# Patient Record
Sex: MALE | Race: WHITE | NOT HISPANIC OR LATINO
[De-identification: names, ages, dates, MRNs, and addresses within clinical notes are randomized per-mention and may not be internally consistent; named-entity substitution may affect disease eponyms.]

---

## 2016-11-17 RX ORDER — DOCUSATE SODIUM 100 MG
1 CAPSULE ORAL
Qty: 0 | Refills: 0 | COMMUNITY
Start: 2016-11-17

## 2017-01-03 ENCOUNTER — NON-APPOINTMENT (OUTPATIENT)
Age: 82
End: 2017-01-03

## 2017-01-03 ENCOUNTER — APPOINTMENT (OUTPATIENT)
Dept: CARDIOLOGY | Facility: CLINIC | Age: 82
End: 2017-01-03

## 2017-01-03 VITALS — OXYGEN SATURATION: 96 % | HEART RATE: 77 BPM | SYSTOLIC BLOOD PRESSURE: 130 MMHG | DIASTOLIC BLOOD PRESSURE: 67 MMHG

## 2017-01-03 DIAGNOSIS — B35.6 TINEA CRURIS: ICD-10-CM

## 2017-01-03 DIAGNOSIS — E11.9 TYPE 2 DIABETES MELLITUS W/OUT COMPLICATIONS: ICD-10-CM

## 2017-01-03 DIAGNOSIS — K59.00 CONSTIPATION, UNSPECIFIED: ICD-10-CM

## 2017-01-03 DIAGNOSIS — R33.9 RETENTION OF URINE, UNSPECIFIED: ICD-10-CM

## 2017-01-03 DIAGNOSIS — F41.9 ANXIETY DISORDER, UNSPECIFIED: ICD-10-CM

## 2017-01-03 DIAGNOSIS — H93.8X3 OTHER SPECIFIED DISORDERS OF EAR, BILATERAL: ICD-10-CM

## 2017-01-03 DIAGNOSIS — I70.202 UNSPECIFIED ATHEROSCLEROSIS OF NATIVE ARTERIES OF EXTREMITIES, LEFT LEG: ICD-10-CM

## 2017-01-03 DIAGNOSIS — R54 AGE-RELATED PHYSICAL DEBILITY: ICD-10-CM

## 2017-01-03 DIAGNOSIS — I48.91 UNSPECIFIED ATRIAL FIBRILLATION: ICD-10-CM

## 2017-01-03 DIAGNOSIS — E78.00 PURE HYPERCHOLESTEROLEMIA, UNSPECIFIED: ICD-10-CM

## 2017-01-03 LAB — INR PPP: 1.4 RATIO

## 2017-01-03 RX ORDER — NYSTATIN 100000 1/G
100000 POWDER TOPICAL
Qty: 1 | Refills: 2 | Status: ACTIVE | COMMUNITY
Start: 2017-01-03 | End: 1900-01-01

## 2017-01-03 RX ORDER — FLUTICASONE PROPIONATE 50 UG/1
50 SPRAY, METERED NASAL DAILY
Qty: 1 | Refills: 0 | Status: ACTIVE | COMMUNITY
Start: 2017-01-03 | End: 1900-01-01

## 2017-01-03 RX ORDER — HYDROCORTISONE ACETATE 25 MG/1
25 SUPPOSITORY RECTAL
Qty: 30 | Refills: 0 | Status: ACTIVE | COMMUNITY
Start: 2017-01-03 | End: 1900-01-01

## 2017-01-03 RX ORDER — ROSUVASTATIN CALCIUM 40 MG/1
40 TABLET, FILM COATED ORAL
Qty: 90 | Refills: 2 | Status: ACTIVE | COMMUNITY
Start: 2017-01-03 | End: 1900-01-01

## 2017-01-04 ENCOUNTER — NON-APPOINTMENT (OUTPATIENT)
Age: 82
End: 2017-01-04

## 2017-01-04 PROBLEM — F41.9 ANXIETY: Status: ACTIVE | Noted: 2017-01-04

## 2017-01-05 ENCOUNTER — LABORATORY RESULT (OUTPATIENT)
Age: 82
End: 2017-01-05

## 2017-01-09 ENCOUNTER — LABORATORY RESULT (OUTPATIENT)
Age: 82
End: 2017-01-09

## 2017-01-14 ENCOUNTER — INPATIENT (INPATIENT)
Facility: HOSPITAL | Age: 82
LOS: 5 days | Discharge: HOME | DRG: 372 | End: 2017-01-20
Attending: INTERNAL MEDICINE | Admitting: INTERNAL MEDICINE
Payer: MEDICARE

## 2017-01-14 VITALS
DIASTOLIC BLOOD PRESSURE: 72 MMHG | RESPIRATION RATE: 20 BRPM | OXYGEN SATURATION: 96 % | HEART RATE: 70 BPM | SYSTOLIC BLOOD PRESSURE: 150 MMHG | TEMPERATURE: 97 F

## 2017-01-14 LAB
ALBUMIN SERPL ELPH-MCNC: 3.5 G/DL — SIGNIFICANT CHANGE UP (ref 3.3–5)
ALP SERPL-CCNC: 72 U/L — SIGNIFICANT CHANGE UP (ref 40–120)
ALT FLD-CCNC: 12 U/L RC — SIGNIFICANT CHANGE UP (ref 10–45)
ANION GAP SERPL CALC-SCNC: 19 MMOL/L — HIGH (ref 5–17)
AST SERPL-CCNC: 21 U/L — SIGNIFICANT CHANGE UP (ref 10–40)
BASOPHILS # BLD AUTO: 0.1 K/UL — SIGNIFICANT CHANGE UP (ref 0–0.2)
BASOPHILS NFR BLD AUTO: 0.8 % — SIGNIFICANT CHANGE UP (ref 0–2)
BILIRUB SERPL-MCNC: 0.6 MG/DL — SIGNIFICANT CHANGE UP (ref 0.2–1.2)
BUN SERPL-MCNC: 18 MG/DL — SIGNIFICANT CHANGE UP (ref 7–23)
CALCIUM SERPL-MCNC: 9.8 MG/DL — SIGNIFICANT CHANGE UP (ref 8.4–10.5)
CHLORIDE SERPL-SCNC: 93 MMOL/L — LOW (ref 96–108)
CK MB CFR SERPL CALC: 2 NG/ML — SIGNIFICANT CHANGE UP (ref 0–6.7)
CK SERPL-CCNC: 46 U/L — SIGNIFICANT CHANGE UP (ref 30–200)
CO2 SERPL-SCNC: 23 MMOL/L — SIGNIFICANT CHANGE UP (ref 22–31)
CREAT SERPL-MCNC: 1.36 MG/DL — HIGH (ref 0.5–1.3)
EOSINOPHIL # BLD AUTO: 0 K/UL — SIGNIFICANT CHANGE UP (ref 0–0.5)
EOSINOPHIL NFR BLD AUTO: 0.4 % — SIGNIFICANT CHANGE UP (ref 0–6)
GAS PNL BLDV: SIGNIFICANT CHANGE UP
GLUCOSE SERPL-MCNC: 198 MG/DL — HIGH (ref 70–99)
HCT VFR BLD CALC: 31.3 % — LOW (ref 39–50)
HGB BLD-MCNC: 10.9 G/DL — LOW (ref 13–17)
LYMPHOCYTES # BLD AUTO: 1.5 K/UL — SIGNIFICANT CHANGE UP (ref 1–3.3)
LYMPHOCYTES # BLD AUTO: 16.7 % — SIGNIFICANT CHANGE UP (ref 13–44)
MCHC RBC-ENTMCNC: 32.9 PG — SIGNIFICANT CHANGE UP (ref 27–34)
MCHC RBC-ENTMCNC: 34.9 GM/DL — SIGNIFICANT CHANGE UP (ref 32–36)
MCV RBC AUTO: 94.3 FL — SIGNIFICANT CHANGE UP (ref 80–100)
MONOCYTES # BLD AUTO: 0.8 K/UL — SIGNIFICANT CHANGE UP (ref 0–0.9)
MONOCYTES NFR BLD AUTO: 9.4 % — SIGNIFICANT CHANGE UP (ref 2–14)
NEUTROPHILS # BLD AUTO: 6.5 K/UL — SIGNIFICANT CHANGE UP (ref 1.8–7.4)
NEUTROPHILS NFR BLD AUTO: 72.5 % — SIGNIFICANT CHANGE UP (ref 43–77)
PLATELET # BLD AUTO: 269 K/UL — SIGNIFICANT CHANGE UP (ref 150–400)
POTASSIUM SERPL-MCNC: 5.3 MMOL/L — SIGNIFICANT CHANGE UP (ref 3.5–5.3)
POTASSIUM SERPL-SCNC: 5.3 MMOL/L — SIGNIFICANT CHANGE UP (ref 3.5–5.3)
PROT SERPL-MCNC: 6.9 G/DL — SIGNIFICANT CHANGE UP (ref 6–8.3)
RBC # BLD: 3.32 M/UL — LOW (ref 4.2–5.8)
RBC # FLD: 15.1 % — HIGH (ref 10.3–14.5)
SODIUM SERPL-SCNC: 135 MMOL/L — SIGNIFICANT CHANGE UP (ref 135–145)
TROPONIN T SERPL-MCNC: 0.03 NG/ML — SIGNIFICANT CHANGE UP (ref 0–0.06)
WBC # BLD: 8.9 K/UL — SIGNIFICANT CHANGE UP (ref 3.8–10.5)
WBC # FLD AUTO: 8.9 K/UL — SIGNIFICANT CHANGE UP (ref 3.8–10.5)

## 2017-01-14 PROCEDURE — 71260 CT THORAX DX C+: CPT | Mod: 26

## 2017-01-14 PROCEDURE — 74177 CT ABD & PELVIS W/CONTRAST: CPT | Mod: 26

## 2017-01-14 PROCEDURE — 93010 ELECTROCARDIOGRAM REPORT: CPT

## 2017-01-14 PROCEDURE — 72125 CT NECK SPINE W/O DYE: CPT | Mod: 26

## 2017-01-14 PROCEDURE — 70450 CT HEAD/BRAIN W/O DYE: CPT | Mod: 26

## 2017-01-14 PROCEDURE — 99285 EMERGENCY DEPT VISIT HI MDM: CPT | Mod: 25

## 2017-01-14 RX ORDER — SODIUM CHLORIDE 9 MG/ML
500 INJECTION INTRAMUSCULAR; INTRAVENOUS; SUBCUTANEOUS ONCE
Qty: 0 | Refills: 0 | Status: COMPLETED | OUTPATIENT
Start: 2017-01-14 | End: 2017-01-14

## 2017-01-14 RX ORDER — ACETAMINOPHEN 500 MG
1000 TABLET ORAL ONCE
Qty: 0 | Refills: 0 | Status: COMPLETED | OUTPATIENT
Start: 2017-01-14 | End: 2017-01-14

## 2017-01-14 RX ADMIN — SODIUM CHLORIDE 500 MILLILITER(S): 9 INJECTION INTRAMUSCULAR; INTRAVENOUS; SUBCUTANEOUS at 21:36

## 2017-01-14 RX ADMIN — Medication 400 MILLIGRAM(S): at 18:44

## 2017-01-14 NOTE — ED ADULT NURSE NOTE - PSH
Personal History of CABG (Coronary Artery Bypass Graft)  30  years ago  Personal History of Heart Valve Repair  15 years ago unsure which valve?  s/p Angioplasty with Stent  6 years ago

## 2017-01-14 NOTE — ED PROVIDER NOTE - OBJECTIVE STATEMENT
85M hx htn hld dm afib on coumadin pad presents with syncope. Pt was having a lot of diarrhea today had a syncopal episode fell hit head head. Now complaining of head pain neck abd pain. no chest pain or sob. moderate constant non-radiating

## 2017-01-14 NOTE — ED PROVIDER NOTE - MEDICAL DECISION MAKING DETAILS
Abdominal pain with diarrhea weakness resulting in syncope nonfocal neuro abdo tender diffuse iv hydration labs CT abdomen and pelvis admit --Pinon

## 2017-01-14 NOTE — ED PROVIDER NOTE - SKIN, MLM
Skin normal color for race, warm, dry and intact. No evidence of rash. Skin normal color for race, warm, dry and intact. No evidence of rash. Bilateral heel pressure sores darkened areas over posterior aspect of heels

## 2017-01-14 NOTE — ED PROVIDER NOTE - PSYCHIATRIC, MLM
Alert and oriented to person, place, time/situation. normal mood and affect. no apparent risk to self or others. anxious

## 2017-01-14 NOTE — ED PROVIDER NOTE - ATTENDING CONTRIBUTION TO CARE
I have seen and evaluated this patient with the resident.   I agree with the findings  unless other wise stated.  I have made appropriate changes in documentations where needed, After my face to face bedside evaluation, I am further  noting: Abdominal pain with diarrhea weakness resulting in syncope nonfocal neuro abdo tender diffuse iv hydration labs CT abdomen and pelvis admit --Pinon

## 2017-01-14 NOTE — ED ADULT NURSE NOTE - EENT WDL
Eyes with no visual disturbances.  Patient has some difficulty hearing.  Patient states his ear feels "clogged"

## 2017-01-14 NOTE — ED ADULT NURSE NOTE - PMH
Atrial fibrillation    CAD (Coronary Artery Disease)    Cataract    DM (Diabetes Mellitus)    Foot ulcer  left foot  GERD (Gastroesophageal Reflux Disease)    HTN (hypertension)    Hyperlipidemia    PAD (peripheral artery disease)    PC (Prostate Cancer)  treated with seed implant as per pt.'s wife 15 yrs ago  Vertigo

## 2017-01-14 NOTE — ED ADULT NURSE NOTE - OBJECTIVE STATEMENT
86 y/o M presents to the ED via EMS because the patient synopsized, as per wife.  Wife states that the patient was in the bathroom with diarrhea and when the wife went to help him, he synopsized.  Wife states that the diarrhea started this morning.  Patient was in the ED because of a UTI and pneumonia on December 15th.  The patient states that he ears feel clogged.  The wife states that when the patient fell his "eyes rolled back and he bite his tongue".  The wife states when he fell he hit his chin.  Wife states that the patient had LOC, and the patient states he does not remember the episode.  Patient is A&Ox4. Face is symmetrical.  Patient has inspiratory wheezes bilaterally.  Patient with a hx of COPD on home O2 of 2L.   Patient states that he is unable to walk and has generalized weakness to the lower extremities.  Patient mucous membranes are dry.   Patient denies any vomiting, chest pain, fever, chills headache, dizziness.  Wife at bedside.  Patient safety and comfort measures provided.

## 2017-01-15 DIAGNOSIS — R55 SYNCOPE AND COLLAPSE: ICD-10-CM

## 2017-01-15 DIAGNOSIS — I48.91 UNSPECIFIED ATRIAL FIBRILLATION: ICD-10-CM

## 2017-01-15 DIAGNOSIS — N18.9 CHRONIC KIDNEY DISEASE, UNSPECIFIED: ICD-10-CM

## 2017-01-15 DIAGNOSIS — L97.509 NON-PRESSURE CHRONIC ULCER OF OTHER PART OF UNSPECIFIED FOOT WITH UNSPECIFIED SEVERITY: ICD-10-CM

## 2017-01-15 DIAGNOSIS — E11.9 TYPE 2 DIABETES MELLITUS WITHOUT COMPLICATIONS: ICD-10-CM

## 2017-01-15 DIAGNOSIS — I10 ESSENTIAL (PRIMARY) HYPERTENSION: ICD-10-CM

## 2017-01-15 DIAGNOSIS — C61 MALIGNANT NEOPLASM OF PROSTATE: ICD-10-CM

## 2017-01-15 DIAGNOSIS — R19.7 DIARRHEA, UNSPECIFIED: ICD-10-CM

## 2017-01-15 DIAGNOSIS — Z41.8 ENCOUNTER FOR OTHER PROCEDURES FOR PURPOSES OTHER THAN REMEDYING HEALTH STATE: ICD-10-CM

## 2017-01-15 LAB
ANION GAP SERPL CALC-SCNC: 14 MMOL/L — SIGNIFICANT CHANGE UP (ref 5–17)
APPEARANCE UR: CLEAR — SIGNIFICANT CHANGE UP
APTT BLD: 32.2 SEC — SIGNIFICANT CHANGE UP (ref 27.5–37.4)
BILIRUB UR-MCNC: NEGATIVE — SIGNIFICANT CHANGE UP
BUN SERPL-MCNC: 15 MG/DL — SIGNIFICANT CHANGE UP (ref 7–23)
CALCIUM SERPL-MCNC: 10 MG/DL — SIGNIFICANT CHANGE UP (ref 8.4–10.5)
CHLORIDE SERPL-SCNC: 97 MMOL/L — SIGNIFICANT CHANGE UP (ref 96–108)
CK MB CFR SERPL CALC: 1.9 NG/ML — SIGNIFICANT CHANGE UP (ref 0–6.7)
CK SERPL-CCNC: 38 U/L — SIGNIFICANT CHANGE UP (ref 30–200)
CO2 SERPL-SCNC: 26 MMOL/L — SIGNIFICANT CHANGE UP (ref 22–31)
COLOR SPEC: SIGNIFICANT CHANGE UP
CREAT SERPL-MCNC: 1.34 MG/DL — HIGH (ref 0.5–1.3)
DIFF PNL FLD: NEGATIVE — SIGNIFICANT CHANGE UP
GLUCOSE SERPL-MCNC: 82 MG/DL — SIGNIFICANT CHANGE UP (ref 70–99)
GLUCOSE UR QL: NEGATIVE — SIGNIFICANT CHANGE UP
HCT VFR BLD CALC: 30.9 % — LOW (ref 39–50)
HGB BLD-MCNC: 10.4 G/DL — LOW (ref 13–17)
INR BLD: 1.82 RATIO — HIGH (ref 0.88–1.16)
KETONES UR-MCNC: NEGATIVE — SIGNIFICANT CHANGE UP
LACTATE SERPL-SCNC: 1.5 MMOL/L — SIGNIFICANT CHANGE UP (ref 0.7–2)
LEUKOCYTE ESTERASE UR-ACNC: ABNORMAL
MAGNESIUM SERPL-MCNC: 2.3 MG/DL — SIGNIFICANT CHANGE UP (ref 1.6–2.6)
MCHC RBC-ENTMCNC: 32 PG — SIGNIFICANT CHANGE UP (ref 27–34)
MCHC RBC-ENTMCNC: 33.7 GM/DL — SIGNIFICANT CHANGE UP (ref 32–36)
MCV RBC AUTO: 95.1 FL — SIGNIFICANT CHANGE UP (ref 80–100)
NITRITE UR-MCNC: NEGATIVE — SIGNIFICANT CHANGE UP
PH UR: 7 — SIGNIFICANT CHANGE UP (ref 4.8–8)
PLATELET # BLD AUTO: 287 K/UL — SIGNIFICANT CHANGE UP (ref 150–400)
POTASSIUM SERPL-MCNC: 4.6 MMOL/L — SIGNIFICANT CHANGE UP (ref 3.5–5.3)
POTASSIUM SERPL-SCNC: 4.6 MMOL/L — SIGNIFICANT CHANGE UP (ref 3.5–5.3)
PROT UR-MCNC: NEGATIVE — SIGNIFICANT CHANGE UP
PROTHROM AB SERPL-ACNC: 19.8 SEC — HIGH (ref 10–13.1)
RBC # BLD: 3.25 M/UL — LOW (ref 4.2–5.8)
RBC # FLD: 15.9 % — HIGH (ref 10.3–14.5)
SODIUM SERPL-SCNC: 137 MMOL/L — SIGNIFICANT CHANGE UP (ref 135–145)
SP GR SPEC: 1.02 — SIGNIFICANT CHANGE UP (ref 1.01–1.02)
TROPONIN T SERPL-MCNC: 0.04 NG/ML — SIGNIFICANT CHANGE UP (ref 0–0.06)
TROPONIN T SERPL-MCNC: 0.05 NG/ML — SIGNIFICANT CHANGE UP (ref 0–0.06)
UROBILINOGEN FLD QL: NEGATIVE — SIGNIFICANT CHANGE UP
WBC # BLD: 7.09 K/UL — SIGNIFICANT CHANGE UP (ref 3.8–10.5)
WBC # FLD AUTO: 7.09 K/UL — SIGNIFICANT CHANGE UP (ref 3.8–10.5)

## 2017-01-15 RX ORDER — METOPROLOL TARTRATE 50 MG
50 TABLET ORAL DAILY
Qty: 0 | Refills: 0 | Status: DISCONTINUED | OUTPATIENT
Start: 2017-01-15 | End: 2017-01-20

## 2017-01-15 RX ORDER — INSULIN LISPRO 100/ML
VIAL (ML) SUBCUTANEOUS AT BEDTIME
Qty: 0 | Refills: 0 | Status: DISCONTINUED | OUTPATIENT
Start: 2017-01-15 | End: 2017-01-20

## 2017-01-15 RX ORDER — METOPROLOL TARTRATE 50 MG
50 TABLET ORAL DAILY
Qty: 0 | Refills: 0 | Status: DISCONTINUED | OUTPATIENT
Start: 2017-01-15 | End: 2017-01-15

## 2017-01-15 RX ORDER — WARFARIN SODIUM 2.5 MG/1
3 TABLET ORAL ONCE
Qty: 0 | Refills: 0 | Status: COMPLETED | OUTPATIENT
Start: 2017-01-15 | End: 2017-01-15

## 2017-01-15 RX ORDER — GLUCAGON INJECTION, SOLUTION 0.5 MG/.1ML
1 INJECTION, SOLUTION SUBCUTANEOUS ONCE
Qty: 0 | Refills: 0 | Status: DISCONTINUED | OUTPATIENT
Start: 2017-01-15 | End: 2017-01-20

## 2017-01-15 RX ORDER — AMLODIPINE BESYLATE 2.5 MG/1
5 TABLET ORAL DAILY
Qty: 0 | Refills: 0 | Status: DISCONTINUED | OUTPATIENT
Start: 2017-01-15 | End: 2017-01-20

## 2017-01-15 RX ORDER — SODIUM CHLORIDE 9 MG/ML
1000 INJECTION, SOLUTION INTRAVENOUS
Qty: 0 | Refills: 0 | Status: DISCONTINUED | OUTPATIENT
Start: 2017-01-15 | End: 2017-01-20

## 2017-01-15 RX ORDER — DEXTROSE 50 % IN WATER 50 %
1 SYRINGE (ML) INTRAVENOUS ONCE
Qty: 0 | Refills: 0 | Status: DISCONTINUED | OUTPATIENT
Start: 2017-01-15 | End: 2017-01-20

## 2017-01-15 RX ORDER — INSULIN LISPRO 100/ML
VIAL (ML) SUBCUTANEOUS
Qty: 0 | Refills: 0 | Status: DISCONTINUED | OUTPATIENT
Start: 2017-01-15 | End: 2017-01-20

## 2017-01-15 RX ORDER — DEXTROSE 50 % IN WATER 50 %
25 SYRINGE (ML) INTRAVENOUS ONCE
Qty: 0 | Refills: 0 | Status: DISCONTINUED | OUTPATIENT
Start: 2017-01-15 | End: 2017-01-20

## 2017-01-15 RX ORDER — ASPIRIN/CALCIUM CARB/MAGNESIUM 324 MG
81 TABLET ORAL DAILY
Qty: 0 | Refills: 0 | Status: DISCONTINUED | OUTPATIENT
Start: 2017-01-15 | End: 2017-01-15

## 2017-01-15 RX ORDER — HYDROCORTISONE 1 %
1 OINTMENT (GRAM) TOPICAL AT BEDTIME
Qty: 0 | Refills: 0 | Status: DISCONTINUED | OUTPATIENT
Start: 2017-01-15 | End: 2017-01-20

## 2017-01-15 RX ORDER — MECLIZINE HCL 12.5 MG
25 TABLET ORAL EVERY 6 HOURS
Qty: 0 | Refills: 0 | Status: DISCONTINUED | OUTPATIENT
Start: 2017-01-15 | End: 2017-01-20

## 2017-01-15 RX ORDER — ENZALUTAMIDE 80 MG/1
160 TABLET ORAL DAILY
Qty: 0 | Refills: 0 | Status: DISCONTINUED | OUTPATIENT
Start: 2017-01-15 | End: 2017-01-17

## 2017-01-15 RX ORDER — ZALEPLON 10 MG
5 CAPSULE ORAL AT BEDTIME
Qty: 0 | Refills: 0 | Status: DISCONTINUED | OUTPATIENT
Start: 2017-01-15 | End: 2017-01-20

## 2017-01-15 RX ORDER — BUDESONIDE, MICRONIZED 100 %
0.5 POWDER (GRAM) MISCELLANEOUS
Qty: 0 | Refills: 0 | Status: DISCONTINUED | OUTPATIENT
Start: 2017-01-15 | End: 2017-01-20

## 2017-01-15 RX ORDER — ATORVASTATIN CALCIUM 80 MG/1
40 TABLET, FILM COATED ORAL AT BEDTIME
Qty: 0 | Refills: 0 | Status: DISCONTINUED | OUTPATIENT
Start: 2017-01-15 | End: 2017-01-20

## 2017-01-15 RX ORDER — HEPARIN SODIUM 5000 [USP'U]/ML
5000 INJECTION INTRAVENOUS; SUBCUTANEOUS EVERY 8 HOURS
Qty: 0 | Refills: 0 | Status: DISCONTINUED | OUTPATIENT
Start: 2017-01-15 | End: 2017-01-15

## 2017-01-15 RX ORDER — DEXTROSE 50 % IN WATER 50 %
12.5 SYRINGE (ML) INTRAVENOUS ONCE
Qty: 0 | Refills: 0 | Status: DISCONTINUED | OUTPATIENT
Start: 2017-01-15 | End: 2017-01-20

## 2017-01-15 RX ORDER — SODIUM CHLORIDE 9 MG/ML
1000 INJECTION INTRAMUSCULAR; INTRAVENOUS; SUBCUTANEOUS
Qty: 0 | Refills: 0 | Status: DISCONTINUED | OUTPATIENT
Start: 2017-01-15 | End: 2017-01-18

## 2017-01-15 RX ORDER — CLOPIDOGREL BISULFATE 75 MG/1
75 TABLET, FILM COATED ORAL DAILY
Qty: 0 | Refills: 0 | Status: DISCONTINUED | OUTPATIENT
Start: 2017-01-15 | End: 2017-01-20

## 2017-01-15 RX ADMIN — Medication 50 MILLIGRAM(S): at 11:36

## 2017-01-15 RX ADMIN — Medication 25 MILLIGRAM(S): at 20:30

## 2017-01-15 RX ADMIN — SODIUM CHLORIDE 75 MILLILITER(S): 9 INJECTION INTRAMUSCULAR; INTRAVENOUS; SUBCUTANEOUS at 17:27

## 2017-01-15 RX ADMIN — AMLODIPINE BESYLATE 5 MILLIGRAM(S): 2.5 TABLET ORAL at 11:37

## 2017-01-15 RX ADMIN — Medication 81 MILLIGRAM(S): at 12:10

## 2017-01-15 RX ADMIN — ATORVASTATIN CALCIUM 40 MILLIGRAM(S): 80 TABLET, FILM COATED ORAL at 21:55

## 2017-01-15 RX ADMIN — CLOPIDOGREL BISULFATE 75 MILLIGRAM(S): 75 TABLET, FILM COATED ORAL at 12:10

## 2017-01-15 RX ADMIN — WARFARIN SODIUM 3 MILLIGRAM(S): 2.5 TABLET ORAL at 21:55

## 2017-01-15 RX ADMIN — Medication 1000 MILLIGRAM(S): at 07:24

## 2017-01-15 RX ADMIN — Medication 1 SUPPOSITORY(S): at 22:34

## 2017-01-15 NOTE — H&P ADULT. - HISTORY OF PRESENT ILLNESS
85 year old recent admission Afib on coumadin,CKD, Prostate Ca on chemo,DMII,HTN,PAD presents for syncope at home. Pt reported that he was being carried to the bathroom by his aide when he had a massive loose bowel movement and he passed out.As per documentation from wife patient fell his "eyes rolled back and he bite his tongue".  The wife states when he fell he hit his chin.  Wife states that the patient had LOC He reports waking up with his wife placing a cool compress on his forehead. Prior to fainting he reports feeling dizzy but denies palpitations,chest pain,melena,or shortness of breath. Pt denies hitting his head but does report having b/l rib pain where he was held by the aides.     In the ED T:97.3 P:70 /72 RR:20 O2:96 on 2L. He was given 500ccbolus and IV tylenol 1G 85 year old M pmh recent admission for uti, Afib on coumadin,CKD, Prostate Ca on chemo,DMII,HTN,PAD presents for syncope at home. Pt reported that he was being carried to the bathroom by his aide when he had a massive loose bowel movement and he passed out.As per wife he rolled his eyes in the back of his head and bit his tongue and subsequently hit his chin. Wife states that the patient had LOC He reports waking up with his wife placing a cool compress on his forehead. Prior to fainting he reports feeling dizzy but denies palpitations,chest pain,melena,or shortness of breath. Pt denies hitting his head but does report having b/l rib pain where he was held by the aides.     In the ED T:97.3 P:70 /72 RR:20 O2:96 on 2L. He was given 500ccbolus and IV tylenol 1G

## 2017-01-15 NOTE — PROVIDER CONTACT NOTE (OTHER) - BACKGROUND
Pt came in with syncope and collapse. Pt has a hx of a fib, HTN, foot ulcer, GERD, PAD, vertigo, PC, DM, and CAD.

## 2017-01-15 NOTE — H&P ADULT. - PROBLEM SELECTOR PLAN 4
BP well controleld currently   -Reintroduce BP meds slowly given concern for orthostatic hypotension BP well controlled currently   -c/w toprol and Norvasc

## 2017-01-15 NOTE — H&P ADULT. - PROBLEM SELECTOR PLAN 1
Pt had LOC at home in setting of diarrhea and lasix use. Likely orthostatic vs vasovagal. Will r/o cardiac causes like ACS. Low suspicion for GI bleed  -TTE   -Telemetry monitoring  -CE X 3 q6  -NS @75 xx X12 hours  -Hold BP meds. and lasix. Restart slowly as pt tolerates   -check orthostatics Pt had LOC at home in setting of diarrhea and lasix use. Likely vasovagal vs orthostatic hypotension in the setting of massive diarrheal episode. Will r/o cardiac causes like ACS. Low suspicion for seizure given lack of focal deficits on exam and presentation. Orthostatics neg after rehydration with IV fluids  -TTE   -Telemetry monitoring  -CE X 3 q6  -NS @75 xx X12 hours  -Hold lasix restart if pt reaccumulates    -consider EEG

## 2017-01-15 NOTE — H&P ADULT. - PROBLEM SELECTOR PLAN 2
Pt describes loose BM at home prior to his syncopal episode. Given multiple hospitalizations suspicion for C diff  -Will check C diff   -Stool O/P   -Hold colace

## 2017-01-15 NOTE — H&P ADULT. - ASSESSMENT
85 year old recent admission Afib on coumadin,CKD, Prostate Ca on chemo,DMII,HTN,PAD presents for syncope at home

## 2017-01-16 LAB
ANION GAP SERPL CALC-SCNC: 16 MMOL/L — SIGNIFICANT CHANGE UP (ref 5–17)
APTT BLD: 34.3 SEC — SIGNIFICANT CHANGE UP (ref 27.5–37.4)
BUN SERPL-MCNC: 13 MG/DL — SIGNIFICANT CHANGE UP (ref 7–23)
C DIFF BY PCR RESULT: DETECTED
C DIFF TOX GENS STL QL NAA+PROBE: SIGNIFICANT CHANGE UP
CALCIUM SERPL-MCNC: 9.1 MG/DL — SIGNIFICANT CHANGE UP (ref 8.4–10.5)
CHLORIDE SERPL-SCNC: 99 MMOL/L — SIGNIFICANT CHANGE UP (ref 96–108)
CO2 SERPL-SCNC: 21 MMOL/L — LOW (ref 22–31)
CREAT SERPL-MCNC: 1.37 MG/DL — HIGH (ref 0.5–1.3)
GLUCOSE SERPL-MCNC: 83 MG/DL — SIGNIFICANT CHANGE UP (ref 70–99)
HBA1C BLD-MCNC: 5 % — SIGNIFICANT CHANGE UP (ref 4–5.6)
HCT VFR BLD CALC: 29.8 % — LOW (ref 39–50)
HGB BLD-MCNC: 9.6 G/DL — LOW (ref 13–17)
INR BLD: 1.59 RATIO — HIGH (ref 0.88–1.16)
MCHC RBC-ENTMCNC: 31.2 PG — SIGNIFICANT CHANGE UP (ref 27–34)
MCHC RBC-ENTMCNC: 32.2 GM/DL — SIGNIFICANT CHANGE UP (ref 32–36)
MCV RBC AUTO: 96.8 FL — SIGNIFICANT CHANGE UP (ref 80–100)
NIGHT BLUE STAIN TISS: SIGNIFICANT CHANGE UP
PLATELET # BLD AUTO: 273 K/UL — SIGNIFICANT CHANGE UP (ref 150–400)
POTASSIUM SERPL-MCNC: 4.9 MMOL/L — SIGNIFICANT CHANGE UP (ref 3.5–5.3)
POTASSIUM SERPL-SCNC: 4.9 MMOL/L — SIGNIFICANT CHANGE UP (ref 3.5–5.3)
PROTHROM AB SERPL-ACNC: 18.1 SEC — HIGH (ref 10–13.1)
RBC # BLD: 3.08 M/UL — LOW (ref 4.2–5.8)
RBC # FLD: 16.2 % — HIGH (ref 10.3–14.5)
SODIUM SERPL-SCNC: 136 MMOL/L — SIGNIFICANT CHANGE UP (ref 135–145)
SPECIMEN SOURCE: SIGNIFICANT CHANGE UP
WBC # BLD: 6.75 K/UL — SIGNIFICANT CHANGE UP (ref 3.8–10.5)
WBC # FLD AUTO: 6.75 K/UL — SIGNIFICANT CHANGE UP (ref 3.8–10.5)

## 2017-01-16 PROCEDURE — 99223 1ST HOSP IP/OBS HIGH 75: CPT

## 2017-01-16 PROCEDURE — 99233 SBSQ HOSP IP/OBS HIGH 50: CPT | Mod: GC

## 2017-01-16 RX ORDER — SODIUM CHLORIDE 0.65 %
1 AEROSOL, SPRAY (ML) NASAL THREE TIMES A DAY
Qty: 0 | Refills: 0 | Status: DISCONTINUED | OUTPATIENT
Start: 2017-01-16 | End: 2017-01-20

## 2017-01-16 RX ORDER — VANCOMYCIN HCL 1 G
125 VIAL (EA) INTRAVENOUS EVERY 6 HOURS
Qty: 0 | Refills: 0 | Status: DISCONTINUED | OUTPATIENT
Start: 2017-01-16 | End: 2017-01-20

## 2017-01-16 RX ORDER — WARFARIN SODIUM 2.5 MG/1
3 TABLET ORAL ONCE
Qty: 0 | Refills: 0 | Status: COMPLETED | OUTPATIENT
Start: 2017-01-16 | End: 2017-01-16

## 2017-01-16 RX ADMIN — SODIUM CHLORIDE 75 MILLILITER(S): 9 INJECTION INTRAMUSCULAR; INTRAVENOUS; SUBCUTANEOUS at 05:43

## 2017-01-16 RX ADMIN — Medication 125 MILLIGRAM(S): at 23:39

## 2017-01-16 RX ADMIN — Medication 25 MILLIGRAM(S): at 08:48

## 2017-01-16 RX ADMIN — Medication 1 SUPPOSITORY(S): at 22:10

## 2017-01-16 RX ADMIN — Medication 25 MILLIGRAM(S): at 22:04

## 2017-01-16 RX ADMIN — Medication 25 MILLIGRAM(S): at 16:04

## 2017-01-16 RX ADMIN — WARFARIN SODIUM 3 MILLIGRAM(S): 2.5 TABLET ORAL at 22:04

## 2017-01-16 RX ADMIN — Medication 125 MILLIGRAM(S): at 11:21

## 2017-01-16 RX ADMIN — Medication 1 SPRAY(S): at 23:39

## 2017-01-16 RX ADMIN — CLOPIDOGREL BISULFATE 75 MILLIGRAM(S): 75 TABLET, FILM COATED ORAL at 11:21

## 2017-01-16 RX ADMIN — Medication 125 MILLIGRAM(S): at 17:01

## 2017-01-16 RX ADMIN — Medication 50 MILLIGRAM(S): at 05:35

## 2017-01-16 RX ADMIN — Medication 25 MILLIGRAM(S): at 02:44

## 2017-01-16 RX ADMIN — ATORVASTATIN CALCIUM 40 MILLIGRAM(S): 80 TABLET, FILM COATED ORAL at 22:04

## 2017-01-16 RX ADMIN — Medication 5 MILLIGRAM(S): at 00:01

## 2017-01-16 RX ADMIN — Medication 5 MILLIGRAM(S): at 23:39

## 2017-01-16 RX ADMIN — AMLODIPINE BESYLATE 5 MILLIGRAM(S): 2.5 TABLET ORAL at 05:35

## 2017-01-16 NOTE — PROVIDER CONTACT NOTE (OTHER) - SITUATION
Pt urinated 350ml/hr- pt is on normal saline @ 75ml/hr pt has not urinated since 20:00- bladder scan done- retaining 275ml

## 2017-01-16 NOTE — PROVIDER CONTACT NOTE (OTHER) - REASON
Pt urinated 350ml/hr- pt is on normal saline @ 75ml/hr pt has not urinated since 20:00- bladder scan done-retaining 275ml

## 2017-01-16 NOTE — PROVIDER CONTACT NOTE (OTHER) - BACKGROUND
PT came in with syncope and collapse. Pt has a hx of a fib, HTN, foot ulcer, PAD, GERD, vertigo, DM, PC, and CAD.

## 2017-01-16 NOTE — PROVIDER CONTACT NOTE (OTHER) - ASSESSMENT
Pt A&Ox4. VSS. Pt is on normal saline @ 75ml/hr. Pt is not having any pain the bladder or discomfort.

## 2017-01-17 LAB
-  AMPICILLIN/SULBACTAM: SIGNIFICANT CHANGE UP
-  CEFAZOLIN: SIGNIFICANT CHANGE UP
-  CIPROFLOXACIN: SIGNIFICANT CHANGE UP
-  GENTAMICIN: SIGNIFICANT CHANGE UP
-  LEVOFLOXACIN: SIGNIFICANT CHANGE UP
-  NITROFURANTOIN: SIGNIFICANT CHANGE UP
-  OXACILLIN: SIGNIFICANT CHANGE UP
-  PENICILLIN: SIGNIFICANT CHANGE UP
-  RIFAMPIN: SIGNIFICANT CHANGE UP
-  TETRACYCLINE: SIGNIFICANT CHANGE UP
-  TRIMETHOPRIM/SULFAMETHOXAZOLE: SIGNIFICANT CHANGE UP
-  VANCOMYCIN: SIGNIFICANT CHANGE UP
ANION GAP SERPL CALC-SCNC: 13 MMOL/L — SIGNIFICANT CHANGE UP (ref 5–17)
BUN SERPL-MCNC: 13 MG/DL — SIGNIFICANT CHANGE UP (ref 7–23)
CALCIUM SERPL-MCNC: 9.1 MG/DL — SIGNIFICANT CHANGE UP (ref 8.4–10.5)
CHLORIDE SERPL-SCNC: 101 MMOL/L — SIGNIFICANT CHANGE UP (ref 96–108)
CO2 SERPL-SCNC: 21 MMOL/L — LOW (ref 22–31)
CREAT SERPL-MCNC: 1.22 MG/DL — SIGNIFICANT CHANGE UP (ref 0.5–1.3)
CULTURE RESULTS: SIGNIFICANT CHANGE UP
GLUCOSE SERPL-MCNC: 109 MG/DL — HIGH (ref 70–99)
HCT VFR BLD CALC: 29.9 % — LOW (ref 39–50)
HGB BLD-MCNC: 9.9 G/DL — LOW (ref 13–17)
INR BLD: 1.59 RATIO — HIGH (ref 0.88–1.16)
MCHC RBC-ENTMCNC: 32.1 PG — SIGNIFICANT CHANGE UP (ref 27–34)
MCHC RBC-ENTMCNC: 33.1 GM/DL — SIGNIFICANT CHANGE UP (ref 32–36)
MCV RBC AUTO: 97.1 FL — SIGNIFICANT CHANGE UP (ref 80–100)
METHOD TYPE: SIGNIFICANT CHANGE UP
ORGANISM # SPEC MICROSCOPIC CNT: SIGNIFICANT CHANGE UP
ORGANISM # SPEC MICROSCOPIC CNT: SIGNIFICANT CHANGE UP
PLATELET # BLD AUTO: 267 K/UL — SIGNIFICANT CHANGE UP (ref 150–400)
POTASSIUM SERPL-MCNC: 4.6 MMOL/L — SIGNIFICANT CHANGE UP (ref 3.5–5.3)
POTASSIUM SERPL-SCNC: 4.6 MMOL/L — SIGNIFICANT CHANGE UP (ref 3.5–5.3)
PROTHROM AB SERPL-ACNC: 18 SEC — HIGH (ref 10–13.1)
RBC # BLD: 3.08 M/UL — LOW (ref 4.2–5.8)
RBC # FLD: 16.1 % — HIGH (ref 10.3–14.5)
SODIUM SERPL-SCNC: 135 MMOL/L — SIGNIFICANT CHANGE UP (ref 135–145)
SPECIMEN SOURCE: SIGNIFICANT CHANGE UP
WBC # BLD: 6.97 K/UL — SIGNIFICANT CHANGE UP (ref 3.8–10.5)
WBC # FLD AUTO: 6.97 K/UL — SIGNIFICANT CHANGE UP (ref 3.8–10.5)

## 2017-01-17 PROCEDURE — 99233 SBSQ HOSP IP/OBS HIGH 50: CPT

## 2017-01-17 PROCEDURE — 99232 SBSQ HOSP IP/OBS MODERATE 35: CPT

## 2017-01-17 RX ORDER — WARFARIN SODIUM 2.5 MG/1
3 TABLET ORAL ONCE
Qty: 0 | Refills: 0 | Status: COMPLETED | OUTPATIENT
Start: 2017-01-17 | End: 2017-01-17

## 2017-01-17 RX ORDER — ENZALUTAMIDE 80 MG/1
160 TABLET ORAL DAILY
Qty: 0 | Refills: 0 | Status: DISCONTINUED | OUTPATIENT
Start: 2017-01-17 | End: 2017-01-20

## 2017-01-17 RX ADMIN — Medication 125 MILLIGRAM(S): at 05:04

## 2017-01-17 RX ADMIN — AMLODIPINE BESYLATE 5 MILLIGRAM(S): 2.5 TABLET ORAL at 05:04

## 2017-01-17 RX ADMIN — Medication 125 MILLIGRAM(S): at 17:38

## 2017-01-17 RX ADMIN — ENZALUTAMIDE 160 MILLIGRAM(S): 80 TABLET ORAL at 18:32

## 2017-01-17 RX ADMIN — SODIUM CHLORIDE 75 MILLILITER(S): 9 INJECTION INTRAMUSCULAR; INTRAVENOUS; SUBCUTANEOUS at 07:46

## 2017-01-17 RX ADMIN — WARFARIN SODIUM 3 MILLIGRAM(S): 2.5 TABLET ORAL at 22:01

## 2017-01-17 RX ADMIN — ATORVASTATIN CALCIUM 40 MILLIGRAM(S): 80 TABLET, FILM COATED ORAL at 22:01

## 2017-01-17 RX ADMIN — Medication 1 SUPPOSITORY(S): at 22:01

## 2017-01-17 RX ADMIN — SODIUM CHLORIDE 75 MILLILITER(S): 9 INJECTION INTRAMUSCULAR; INTRAVENOUS; SUBCUTANEOUS at 05:04

## 2017-01-17 RX ADMIN — Medication 25 MILLIGRAM(S): at 04:56

## 2017-01-17 RX ADMIN — Medication 25 MILLIGRAM(S): at 23:02

## 2017-01-17 RX ADMIN — Medication 5 MILLIGRAM(S): at 23:06

## 2017-01-17 RX ADMIN — Medication 25 MILLIGRAM(S): at 17:38

## 2017-01-17 RX ADMIN — Medication 50 MILLIGRAM(S): at 05:04

## 2017-01-17 RX ADMIN — Medication 125 MILLIGRAM(S): at 14:47

## 2017-01-17 RX ADMIN — Medication 125 MILLIGRAM(S): at 23:02

## 2017-01-17 RX ADMIN — CLOPIDOGREL BISULFATE 75 MILLIGRAM(S): 75 TABLET, FILM COATED ORAL at 14:47

## 2017-01-17 NOTE — DIETITIAN INITIAL EVALUATION ADULT. - ADHERENCE
Pt reports following a no salt, no sugar diet PTA. Typical intake: cereal with banana and milk, applesauce and diet hot chocolate for breakfast; Ensure with jello and sometimes sugar free cookies for lunch; Ensure or steak or chicken with string beans or homemade soups for dinner. Pt unable to recall medication taken for Diabetes, pt reports checking his blood sugar 2x/day and reports it is usually 78-120mg/dL range. Pt knowledgeable on signs/symptoms and treatment of hypoglycemia. Pt also reports taking Coumadin and reports as per his doctor's recommendation, he only consumes strings beans with dinner to be consistent.

## 2017-01-17 NOTE — DIETITIAN INITIAL EVALUATION ADULT. - PROBLEM SELECTOR PLAN 1
Pt had LOC at home in setting of diarrhea and lasix use. Likely vasovagal vs orthostatic hypotension in the setting of massive diarrheal episode. Will r/o cardiac causes like ACS. Low suspicion for seizure given lack of focal deficits on exam and presentation. Orthostatics neg after rehydration with IV fluids  -TTE   -Telemetry monitoring  -CE X 3 q6  -NS @75 xx X12 hours  -Hold lasix restart if pt reaccumulates    -consider EEG

## 2017-01-17 NOTE — DIETITIAN INITIAL EVALUATION ADULT. - ORAL INTAKE PTA
fair/Pt reports having decreased appetite 2/2 to taste changes. Pt reports drinking Ensure 2-3 times per day along with meals. No micronutrient supplementation PTA.

## 2017-01-17 NOTE — DIETITIAN INITIAL EVALUATION ADULT. - NS AS NUTRI INTERV MEALS SNACK
Consider changing texture to soft, continue consistent carbohydrate, DASH/TLC diet. Encourage po intake with nutrient dense foods. Provide food preferences as able. Monitor weight, lab values, po intake and GI tolerance. RD to remain available for further nutrition interventions as indicated.

## 2017-01-17 NOTE — DIETITIAN INITIAL EVALUATION ADULT. - OTHER INFO
Nutrition referral for stage 2 or greater pressure ulcer. Noted pt awaiting wound care consult for left buttocks stage II pressure ulcer documented in flow sheets. Pt reports his appetite is not that good. Per PCA, pt ate well this morning for breakfast. Noted % meal consumption in flow sheets. Pt willing to try Glucerna. No nausea or vomiting. Pt admitted with diarrhea- c.diff positive- last bowel movement was 1/15. Pt requests soft diet and denies swallowing difficulty. No known food allergies.

## 2017-01-17 NOTE — DIETITIAN INITIAL EVALUATION ADULT. - ENERGY NEEDS
Ht: 68“, Wt: 177.4 lbs, BMI: 27.0 kg/m2, IBW: 154 lbs (+/-10%), %IBW: 115%  Pertinent Information: Pt admitted with syncope likely secondary to diarrhea - c.diff positive. Afib on coumadin, DM 2, foot ulcers- podiatry following. Prostate cancer on chemo.   No edema, podiatry following for foot ulcers (DM PVD), Left buttocks stage II pressure ulcer awaiting assessment by wound care.

## 2017-01-17 NOTE — DIETITIAN INITIAL EVALUATION ADULT. - NS FNS WEIGHT CHANGE REASON
Pt reports he does not weigh himself at home as he does not have a scale but reports his weight has been in the 180 range when he has went to the doctor. Noted per RD note on 12/13/16- pt's wt per bed scale was 187.3 pounds, also noted varied weights during admission in 11/2016 with a standing wt of 176.8 pounds on 11/13/16. Per RD note on 7/13/14, pt weighed 179 pounds. Pt reports having recent wt loss and based on last measured wt during last admission of 185.1 pounds and admission wt of 177.4 pounds, pt appears to have lost about 7.7 pounds in about 1 month, based on bed scale weights./unintentional

## 2017-01-18 LAB
ANION GAP SERPL CALC-SCNC: 17 MMOL/L — SIGNIFICANT CHANGE UP (ref 5–17)
BUN SERPL-MCNC: 10 MG/DL — SIGNIFICANT CHANGE UP (ref 7–23)
CALCIUM SERPL-MCNC: 9 MG/DL — SIGNIFICANT CHANGE UP (ref 8.4–10.5)
CHLORIDE SERPL-SCNC: 102 MMOL/L — SIGNIFICANT CHANGE UP (ref 96–108)
CO2 SERPL-SCNC: 18 MMOL/L — LOW (ref 22–31)
CREAT SERPL-MCNC: 1.11 MG/DL — SIGNIFICANT CHANGE UP (ref 0.5–1.3)
CULTURE RESULTS: SIGNIFICANT CHANGE UP
GLUCOSE SERPL-MCNC: 92 MG/DL — SIGNIFICANT CHANGE UP (ref 70–99)
HCT VFR BLD CALC: 30.5 % — LOW (ref 39–50)
HGB BLD-MCNC: 9.9 G/DL — LOW (ref 13–17)
INR BLD: 1.93 RATIO — HIGH (ref 0.88–1.16)
MCHC RBC-ENTMCNC: 31.3 PG — SIGNIFICANT CHANGE UP (ref 27–34)
MCHC RBC-ENTMCNC: 32.5 GM/DL — SIGNIFICANT CHANGE UP (ref 32–36)
MCV RBC AUTO: 96.5 FL — SIGNIFICANT CHANGE UP (ref 80–100)
PLATELET # BLD AUTO: 225 K/UL — SIGNIFICANT CHANGE UP (ref 150–400)
POTASSIUM SERPL-MCNC: 4.5 MMOL/L — SIGNIFICANT CHANGE UP (ref 3.5–5.3)
POTASSIUM SERPL-SCNC: 4.5 MMOL/L — SIGNIFICANT CHANGE UP (ref 3.5–5.3)
PROTHROM AB SERPL-ACNC: 21.9 SEC — HIGH (ref 10–13.1)
RBC # BLD: 3.16 M/UL — LOW (ref 4.2–5.8)
RBC # FLD: 15.8 % — HIGH (ref 10.3–14.5)
SODIUM SERPL-SCNC: 137 MMOL/L — SIGNIFICANT CHANGE UP (ref 135–145)
SPECIMEN SOURCE: SIGNIFICANT CHANGE UP
WBC # BLD: 6.68 K/UL — SIGNIFICANT CHANGE UP (ref 3.8–10.5)
WBC # FLD AUTO: 6.68 K/UL — SIGNIFICANT CHANGE UP (ref 3.8–10.5)

## 2017-01-18 PROCEDURE — 99233 SBSQ HOSP IP/OBS HIGH 50: CPT

## 2017-01-18 RX ORDER — WARFARIN SODIUM 2.5 MG/1
3 TABLET ORAL ONCE
Qty: 0 | Refills: 0 | Status: COMPLETED | OUTPATIENT
Start: 2017-01-18 | End: 2017-01-19

## 2017-01-18 RX ORDER — WARFARIN SODIUM 2.5 MG/1
3 TABLET ORAL ONCE
Qty: 0 | Refills: 0 | Status: COMPLETED | OUTPATIENT
Start: 2017-01-18 | End: 2017-01-18

## 2017-01-18 RX ORDER — SODIUM CHLORIDE 9 MG/ML
1000 INJECTION INTRAMUSCULAR; INTRAVENOUS; SUBCUTANEOUS
Qty: 0 | Refills: 0 | Status: DISCONTINUED | OUTPATIENT
Start: 2017-01-18 | End: 2017-01-19

## 2017-01-18 RX ADMIN — ENZALUTAMIDE 160 MILLIGRAM(S): 80 TABLET ORAL at 18:37

## 2017-01-18 RX ADMIN — Medication 25 MILLIGRAM(S): at 18:30

## 2017-01-18 RX ADMIN — Medication 1 SPRAY(S): at 12:00

## 2017-01-18 RX ADMIN — Medication 125 MILLIGRAM(S): at 11:32

## 2017-01-18 RX ADMIN — Medication 125 MILLIGRAM(S): at 18:30

## 2017-01-18 RX ADMIN — Medication 25 MILLIGRAM(S): at 11:32

## 2017-01-18 RX ADMIN — SODIUM CHLORIDE 30 MILLILITER(S): 9 INJECTION INTRAMUSCULAR; INTRAVENOUS; SUBCUTANEOUS at 09:56

## 2017-01-18 RX ADMIN — Medication 5 MILLIGRAM(S): at 23:03

## 2017-01-18 RX ADMIN — Medication 1: at 18:30

## 2017-01-18 RX ADMIN — CLOPIDOGREL BISULFATE 75 MILLIGRAM(S): 75 TABLET, FILM COATED ORAL at 11:32

## 2017-01-18 RX ADMIN — Medication 25 MILLIGRAM(S): at 05:40

## 2017-01-18 RX ADMIN — ATORVASTATIN CALCIUM 40 MILLIGRAM(S): 80 TABLET, FILM COATED ORAL at 22:01

## 2017-01-18 RX ADMIN — Medication 125 MILLIGRAM(S): at 05:40

## 2017-01-18 RX ADMIN — AMLODIPINE BESYLATE 5 MILLIGRAM(S): 2.5 TABLET ORAL at 05:40

## 2017-01-18 RX ADMIN — Medication 1 SUPPOSITORY(S): at 22:01

## 2017-01-18 RX ADMIN — WARFARIN SODIUM 3 MILLIGRAM(S): 2.5 TABLET ORAL at 22:00

## 2017-01-19 ENCOUNTER — TRANSCRIPTION ENCOUNTER (OUTPATIENT)
Age: 82
End: 2017-01-19

## 2017-01-19 LAB
INR BLD: 2.88 RATIO — HIGH (ref 0.88–1.16)
PROTHROM AB SERPL-ACNC: 32.9 SEC — HIGH (ref 10–13.1)

## 2017-01-19 PROCEDURE — 99233 SBSQ HOSP IP/OBS HIGH 50: CPT

## 2017-01-19 RX ORDER — LIDOCAINE HCL 20 MG/ML
2 VIAL (ML) INJECTION ONCE
Qty: 0 | Refills: 0 | Status: COMPLETED | OUTPATIENT
Start: 2017-01-19 | End: 2017-01-19

## 2017-01-19 RX ORDER — GOSERELIN ACETATE 10.8 MG/1
3.6 IMPLANT SUBCUTANEOUS ONCE
Qty: 0 | Refills: 0 | Status: COMPLETED | OUTPATIENT
Start: 2017-01-19 | End: 2017-01-19

## 2017-01-19 RX ORDER — SODIUM CHLORIDE 0.65 %
1 AEROSOL, SPRAY (ML) NASAL
Qty: 1 | Refills: 0 | OUTPATIENT
Start: 2017-01-19

## 2017-01-19 RX ORDER — MECLIZINE HCL 12.5 MG
1 TABLET ORAL
Qty: 120 | Refills: 0 | OUTPATIENT
Start: 2017-01-19 | End: 2017-02-18

## 2017-01-19 RX ORDER — DOCUSATE SODIUM 100 MG
100 CAPSULE ORAL
Qty: 0 | Refills: 0 | Status: DISCONTINUED | OUTPATIENT
Start: 2017-01-19 | End: 2017-01-20

## 2017-01-19 RX ORDER — VANCOMYCIN HCL 1 G
2.5 VIAL (EA) INTRAVENOUS
Qty: 100 | Refills: 0 | OUTPATIENT
Start: 2017-01-19 | End: 2017-01-29

## 2017-01-19 RX ORDER — SENNA PLUS 8.6 MG/1
2 TABLET ORAL AT BEDTIME
Qty: 0 | Refills: 0 | Status: DISCONTINUED | OUTPATIENT
Start: 2017-01-19 | End: 2017-01-20

## 2017-01-19 RX ORDER — LACTULOSE 10 G/15ML
10 SOLUTION ORAL ONCE
Qty: 0 | Refills: 0 | Status: DISCONTINUED | OUTPATIENT
Start: 2017-01-19 | End: 2017-01-19

## 2017-01-19 RX ORDER — VANCOMYCIN HCL 1 G
2.5 VIAL (EA) INTRAVENOUS
Qty: 110 | Refills: 0 | OUTPATIENT
Start: 2017-01-19 | End: 2017-01-30

## 2017-01-19 RX ADMIN — ENZALUTAMIDE 160 MILLIGRAM(S): 80 TABLET ORAL at 13:32

## 2017-01-19 RX ADMIN — AMLODIPINE BESYLATE 5 MILLIGRAM(S): 2.5 TABLET ORAL at 06:09

## 2017-01-19 RX ADMIN — Medication 25 MILLIGRAM(S): at 22:27

## 2017-01-19 RX ADMIN — Medication 1: at 13:31

## 2017-01-19 RX ADMIN — SENNA PLUS 2 TABLET(S): 8.6 TABLET ORAL at 22:00

## 2017-01-19 RX ADMIN — Medication 25 MILLIGRAM(S): at 06:10

## 2017-01-19 RX ADMIN — Medication 125 MILLIGRAM(S): at 06:10

## 2017-01-19 RX ADMIN — ATORVASTATIN CALCIUM 40 MILLIGRAM(S): 80 TABLET, FILM COATED ORAL at 21:59

## 2017-01-19 RX ADMIN — WARFARIN SODIUM 3 MILLIGRAM(S): 2.5 TABLET ORAL at 22:00

## 2017-01-19 RX ADMIN — Medication 125 MILLIGRAM(S): at 22:27

## 2017-01-19 RX ADMIN — Medication 125 MILLIGRAM(S): at 18:14

## 2017-01-19 RX ADMIN — Medication 125 MILLIGRAM(S): at 13:31

## 2017-01-19 RX ADMIN — SODIUM CHLORIDE 30 MILLILITER(S): 9 INJECTION INTRAMUSCULAR; INTRAVENOUS; SUBCUTANEOUS at 02:39

## 2017-01-19 RX ADMIN — Medication 5 MILLIGRAM(S): at 22:04

## 2017-01-19 RX ADMIN — Medication 100 MILLIGRAM(S): at 18:14

## 2017-01-19 RX ADMIN — CLOPIDOGREL BISULFATE 75 MILLIGRAM(S): 75 TABLET, FILM COATED ORAL at 13:31

## 2017-01-19 RX ADMIN — Medication 125 MILLIGRAM(S): at 01:16

## 2017-01-19 RX ADMIN — Medication 1 SUPPOSITORY(S): at 22:00

## 2017-01-19 RX ADMIN — Medication 50 MILLIGRAM(S): at 06:10

## 2017-01-19 RX ADMIN — Medication 25 MILLIGRAM(S): at 18:14

## 2017-01-19 RX ADMIN — Medication 25 MILLIGRAM(S): at 13:31

## 2017-01-19 RX ADMIN — Medication 25 MILLIGRAM(S): at 01:16

## 2017-01-19 NOTE — DISCHARGE NOTE ADULT - CARE PROVIDERS DIRECT ADDRESSES
,[email or web address],[email or web address]

## 2017-01-19 NOTE — DISCHARGE NOTE ADULT - PATIENT PORTAL LINK FT
“You can access the [facility] Patient Portal, offered by [facility], by registering with the following website: [email or web address]”

## 2017-01-19 NOTE — DISCHARGE NOTE ADULT - HOSPITAL COURSE
to be completed by MD pt admitted with syncope, dehydration, cdiff colitis    1/15:   Trop x 2 negative              Echo and C diff specimen pending              Dose Coumadin pending Stat INR ( )              Xtandi not approved by pharmacy bec onc not on board --> Dr. Norris called  1/17	Onc: adm Own Xtadi (antineoplastic agent) injection, and will oderGoserelin 3.6mg.  	Pod: Rec b/l heel off load, PT eval, f/u within 1 ~2wks    1/18	decreased IVF 75 to 30ml/hr as per Dr. Landry  1/19    to receive Goserelin 3.6 SQ as per oncology. cont enzalutamide            Pt with BM. refusing D/c home as wife is ill and can not take care of him, he also is worried he will get ill from her. Agree to go home 1/20

## 2017-01-19 NOTE — DISCHARGE NOTE ADULT - MEDICATION SUMMARY - MEDICATIONS TO TAKE
I will START or STAY ON the medications listed below when I get home from the hospital:    budesonide 0.5 mg/2 mL inhalation suspension  -- 1 pump(s) inhaled 2 times a day  -- Indication: For COPD    tamsulosin 0.4 mg oral capsule  -- 1 cap(s) by mouth once a day (at bedtime)  -- Indication: For BPH    Coumadin 3 mg oral tablet  -- 1 tab(s) by mouth once a day  -- Indication: For Atrial fibrillation    glimepiride 1 mg oral tablet  -- 1/2 tab(s) by mouth once a day  -- Indication: For Diabetes mellitus    meclizine 25 mg oral tablet  -- 1 tab(s) by mouth every 6 hours  -- Indication: For orthostatic hypotension     atorvastatin 40 mg oral tablet  -- 1 tab(s) by mouth once a day (at bedtime)  -- Indication: For HLD    enzalutamide 40 mg oral capsule  -- 4 cap(s) by mouth once a day  -- Indication: For PC (Prostate Cancer)    clopidogrel 75 mg oral tablet  -- 1 tab(s) by mouth once a day  -- Indication: For CAD    metoprolol succinate 50 mg oral tablet, extended release  -- 1 tab(s) by mouth once a day  -- Indication: For HTN (hypertension)    Atrovent 18 mcg/inh inhalation aerosol  -- 2 puff(s) inhaled every 6 hours, As Needed  -- Indication: For COPD    amLODIPine 5 mg oral tablet  -- 1 tab(s) by mouth once a day  -- Indication: For HTN (hypertension)    hydrocortisone 25 mg rectal suppository  -- 1 suppository(ies) rectally once a day (at bedtime)  -- Indication: For rectal steroid/hemorroids    furosemide 20 mg oral tablet  -- 1 tab(s) by mouth once a day  -- Indication: For Chf    vancomycin 250 mg/5 mL oral solution  -- 2.5 milliliter(s) by mouth every 6 hours  -- Indication: For C. difficile diarrhea,continue through 1/30    docusate sodium 100 mg oral capsule  -- 1 cap(s) by mouth 2 times a day  -- Indication: For Stool softner    sodium chloride 0.65% nasal spray  -- 1 spray(s) into nose 4 times a day, As Needed  -- Indication: For Nasal spray    esomeprazole 40 mg oral delayed release capsule  -- 1 cap(s) by mouth once a day  -- Indication: For GERD I will START or STAY ON the medications listed below when I get home from the hospital:    budesonide 0.5 mg/2 mL inhalation suspension  -- 1 pump(s) inhaled 2 times a day  -- Indication: For COPD    tamsulosin 0.4 mg oral capsule  -- 1 cap(s) by mouth once a day (at bedtime)  -- Indication: For BPH    Coumadin 3 mg oral tablet  -- 1 tab(s) by mouth once a day  -- Indication: For Atrial fibrillation    glimepiride 1 mg oral tablet  -- 1/2 tab(s) by mouth once a day  -- Indication: For Diabetes mellitus    meclizine 25 mg oral tablet  -- 1 tab(s) by mouth every 6 hours  -- Indication: For orthostatic hypotension     atorvastatin 40 mg oral tablet  -- 1 tab(s) by mouth once a day (at bedtime)  -- Indication: For HLD    enzalutamide 40 mg oral capsule  -- 4 cap(s) by mouth once a day  -- Indication: For PC (Prostate Cancer)    clopidogrel 75 mg oral tablet  -- 1 tab(s) by mouth once a day  -- Indication: For CAD    zaleplon 5 mg oral capsule  -- 1 cap(s) by mouth once a day (at bedtime), As needed, Insomnia MDD:1 tab  -- Indication: For insomnia    metoprolol succinate 50 mg oral tablet, extended release  -- 1 tab(s) by mouth once a day  -- Indication: For HTN (hypertension)    Atrovent 18 mcg/inh inhalation aerosol  -- 2 puff(s) inhaled every 6 hours, As Needed  -- Indication: For COPD    amLODIPine 5 mg oral tablet  -- 1 tab(s) by mouth once a day  -- Indication: For HTN (hypertension)    hydrocortisone 25 mg rectal suppository  -- 1 suppository(ies) rectally once a day (at bedtime)  -- Indication: For rectal steroid/hemorroids    furosemide 20 mg oral tablet  -- 1 tab(s) by mouth once a day  -- Indication: For Chf    vancomycin 250 mg/5 mL oral solution  -- 2.5 milliliter(s) by mouth every 6 hours  -- Indication: For C. difficile diarrhea,continue through 1/30    docusate sodium 100 mg oral capsule  -- 1 cap(s) by mouth 2 times a day  -- Indication: For Stool softner    sodium chloride 0.65% nasal spray  -- 1 spray(s) into nose 4 times a day, As Needed  -- Indication: For Nasal spray    esomeprazole 40 mg oral delayed release capsule  -- 1 cap(s) by mouth once a day  -- Indication: For GERD

## 2017-01-19 NOTE — DISCHARGE NOTE ADULT - PLAN OF CARE
to remain without reoccurring HOME CARE INSTRUCTIONS  Have someone stay with you until you feel stable.  Do not drive, operate machinery, or play sports until your caregiver says it is okay.  Keep all follow-up appointments as directed by your caregiver.   Lie down right away if you start feeling like you might faint. Breathe deeply and steadily. Wait until all the symptoms have passed.Drink enough fluids to keep your urine clear or pale yellow.  If you are taking blood pressure or heart medicine, get up slowly, taking several minutes to sit and then stand. This can reduce dizziness.  SEEK IMMEDIATE MEDICAL CARE IF:  You have a severe headache.  You have unusual pain in the chest, abdomen, or back.  You are bleeding from the mouth or rectum, or you have black or tarry stool.  You have an irregular or very fast heartbeat.  You have pain with breathing.  You have repeated fainting or seizure-like jerking during an episode.  You faint when sitting or lying down.  You have confusion.  You have difficulty walking.  You have severe weakness.  You have vision problems.  If you fainted, call your local emergency services (_____________________). Do not drive yourself to the hospital Continue antibiotics through 1/30/17 . Follow up with Dr. Landry Avoid taking (NSAIDs) - (ex: Ibuprofen, Advil, Celebrex, Naprosyn)  Avoid taking any nephrotoxic agents (can harm kidneys) - Intravenous contrast for diagnostic testing, combination cold medications.  Have all medications adjusted for your renal function by your Health Care Provider.  Blood pressure control is important.  Take all medication as prescribed. continue current treatment, follow up with PMD for management Follow up with your medical doctor to establish long term blood pressure treatment goals. Atrial fibrillation is the most common heart rhythm problem.  The condition puts you at risk for has stroke and heart attack  It helps if you control your blood pressure, not drink more than 1-2 alcohol drinks per day, cut down on caffeine, getting treatment for over active thyroid gland, and get regular exercise  Call your doctor if you feel your heart racing or beating unusually, chest tightness or pain, lightheaded, faint, shortness of breath especially with exercise  It is important to take your heart medication as prescribed  You may be on anticoagulation which is very important to take as directed - you may need blood work to monitor drug levels HgA1C this admission was 5.0  Make sure you get your HgA1c checked every three months.  If you take oral diabetes medications, check your blood glucose two times a day.  If you take insulin, check your blood glucose before meals and at bedtime.  It's important not to skip any meals.  Keep a log of your blood glucose results and always take it with you to your doctor appointments.  Keep a list of your current medications including injectables and over the counter medications and bring this medication list with you to all your doctor appointments.  If you have not seen your ophthalmologist this year call for appointment.  Check your feet daily for redness, sores, or openings. Do not self treat. If no improvement in two days call your primary care physician for an appointment.  Low blood sugar (hypoglycemia) is a blood sugar below 70mg/dl. Check your blood sugar if you feel signs/symptoms of hypoglycemia. If your blood sugar is below 70 take 15 grams of carbohydrates (ex 4 oz of apple juice, 3-4 glucose tablets, or 4-6 oz of regular soda) wait 15 minutes and repeat blood sugar to make sure it comes up above 70.  If your blood sugar is above 70 and you are due for a meal, have a meal.  If you are not due for a meal have a snack.  This snack helps keeps your blood sugar at a safe range.

## 2017-01-19 NOTE — DISCHARGE NOTE ADULT - MEDICATION SUMMARY - MEDICATIONS TO STOP TAKING
I will STOP taking the medications listed below when I get home from the hospital:    bisacodyl 5 mg oral delayed release tablet  -- 1 tab(s) by mouth every 12 hours, As needed, Constipation

## 2017-01-19 NOTE — DISCHARGE NOTE ADULT - CARE PLAN
Principal Discharge DX:	Syncope and collapse  Goal:	to remain without reoccurring  Secondary Diagnosis:	C. difficile diarrhea  Secondary Diagnosis:	Chronic kidney disease, unspecified stage  Secondary Diagnosis:	Foot ulcer  Secondary Diagnosis:	HTN (hypertension)  Secondary Diagnosis:	Chronic atrial fibrillation  Secondary Diagnosis:	DM (Diabetes Mellitus) Principal Discharge DX:	Syncope and collapse  Goal:	to remain without reoccurring  Instructions for follow-up, activity and diet:	HOME CARE INSTRUCTIONS  Have someone stay with you until you feel stable.  Do not drive, operate machinery, or play sports until your caregiver says it is okay.  Keep all follow-up appointments as directed by your caregiver.   Lie down right away if you start feeling like you might faint. Breathe deeply and steadily. Wait until all the symptoms have passed.Drink enough fluids to keep your urine clear or pale yellow.  If you are taking blood pressure or heart medicine, get up slowly, taking several minutes to sit and then stand. This can reduce dizziness.  SEEK IMMEDIATE MEDICAL CARE IF:  You have a severe headache.  You have unusual pain in the chest, abdomen, or back.  You are bleeding from the mouth or rectum, or you have black or tarry stool.  You have an irregular or very fast heartbeat.  You have pain with breathing.  You have repeated fainting or seizure-like jerking during an episode.  You faint when sitting or lying down.  You have confusion.  You have difficulty walking.  You have severe weakness.  You have vision problems.  If you fainted, call your local emergency services (_____________________). Do not drive yourself to the hospital  Secondary Diagnosis:	C. difficile diarrhea  Instructions for follow-up, activity and diet:	Continue antibiotics through 1/30/17 . Follow up with Dr. Landry  Secondary Diagnosis:	Chronic kidney disease, unspecified stage  Instructions for follow-up, activity and diet:	Avoid taking (NSAIDs) - (ex: Ibuprofen, Advil, Celebrex, Naprosyn)  Avoid taking any nephrotoxic agents (can harm kidneys) - Intravenous contrast for diagnostic testing, combination cold medications.  Have all medications adjusted for your renal function by your Health Care Provider.  Blood pressure control is important.  Take all medication as prescribed.  Secondary Diagnosis:	Foot ulcer  Instructions for follow-up, activity and diet:	continue current treatment, follow up with PMD for management  Secondary Diagnosis:	HTN (hypertension)  Instructions for follow-up, activity and diet:	Follow up with your medical doctor to establish long term blood pressure treatment goals.  Secondary Diagnosis:	Chronic atrial fibrillation  Instructions for follow-up, activity and diet:	Atrial fibrillation is the most common heart rhythm problem.  The condition puts you at risk for has stroke and heart attack  It helps if you control your blood pressure, not drink more than 1-2 alcohol drinks per day, cut down on caffeine, getting treatment for over active thyroid gland, and get regular exercise  Call your doctor if you feel your heart racing or beating unusually, chest tightness or pain, lightheaded, faint, shortness of breath especially with exercise  It is important to take your heart medication as prescribed  You may be on anticoagulation which is very important to take as directed - you may need blood work to monitor drug levels  Secondary Diagnosis:	DM (Diabetes Mellitus)  Instructions for follow-up, activity and diet:	HgA1C this admission was 5.0  Make sure you get your HgA1c checked every three months.  If you take oral diabetes medications, check your blood glucose two times a day.  If you take insulin, check your blood glucose before meals and at bedtime.  It's important not to skip any meals.  Keep a log of your blood glucose results and always take it with you to your doctor appointments.  Keep a list of your current medications including injectables and over the counter medications and bring this medication list with you to all your doctor appointments.  If you have not seen your ophthalmologist this year call for appointment.  Check your feet daily for redness, sores, or openings. Do not self treat. If no improvement in two days call your primary care physician for an appointment.  Low blood sugar (hypoglycemia) is a blood sugar below 70mg/dl. Check your blood sugar if you feel signs/symptoms of hypoglycemia. If your blood sugar is below 70 take 15 grams of carbohydrates (ex 4 oz of apple juice, 3-4 glucose tablets, or 4-6 oz of regular soda) wait 15 minutes and repeat blood sugar to make sure it comes up above 70.  If your blood sugar is above 70 and you are due for a meal, have a meal.  If you are not due for a meal have a snack.  This snack helps keeps your blood sugar at a safe range.

## 2017-01-19 NOTE — PHYSICAL THERAPY INITIAL EVALUATION ADULT - DISCHARGE DISPOSITION, PT EVAL
anticipate [facility] upon further functional assessment; pt prefers home with home PT and assist subacute rehab; pt prefers home with home PT and assist/rehabilitation facility

## 2017-01-19 NOTE — PHYSICAL THERAPY INITIAL EVALUATION ADULT - GENERAL OBSERVATIONS, REHAB EVAL
Pt received semi-supine in bed +2L O2 NC, tele monitor, z-flex boots, IV in NAD agreeable to PT initial evaluation

## 2017-01-19 NOTE — DISCHARGE NOTE ADULT - HOME CARE AGENCY
[facility] - [phone] - Registered Nurse will contact you to arrange initial visit.  Physical Therapy, Home Health Aide, and Social Work to follow.

## 2017-01-19 NOTE — DISCHARGE NOTE ADULT - MEDICATION SUMMARY - MEDICATIONS TO CHANGE
I will SWITCH the dose or number of times a day I take the medications listed below when I get home from the hospital:    meclizine 25 mg oral tablet  -- 1 tab(s) by mouth every 12 hours

## 2017-01-19 NOTE — PHYSICAL THERAPY INITIAL EVALUATION ADULT - PERTINENT HX OF CURRENT PROBLEM, REHAB EVAL
85y/oM presents for syncope at home. Pt reported that he was being carried to the bathroom by his aide when he had a massive loose bowel movement and he passed out.As per wife he rolled his eyes in the back of his head and bit his tongue and subsequently hit his chin. Wife states that the patient had LOC He reports waking up with his wife placing a cool compress on his forehead. (cont below)

## 2017-01-19 NOTE — DISCHARGE NOTE ADULT - NS AS DC VTE INSTRUCTION
Coumadin/Warfarin - Compliance.../Coumadin/Warfarin - Potential for adverse drug reactions and interactions/Coumadin/Warfarin - Dietary Advice.../Coumadin/Warfarin - Follow-up monitoring...

## 2017-01-19 NOTE — PHYSICAL THERAPY INITIAL EVALUATION ADULT - ADDITIONAL COMMENTS
(cont from above): Prior to fainting he reports feeling dizzy but denies palpitations,chest pain,melena,or shortness of breath. Pt denies hitting his head but does report having b/l rib pain where he was held by the aides.In the ED T:97.3 P:70 /72 RR:20 O2:96 on 2L. He was given 500ccbolus and IV tylenol 1G Head CT: (-) Cervical spineCT: (-) CTChest/Abdomen/Pelvis:No evidence of acute intrathoracic or intra-abdominal injury. (cont from above): Prior to fainting he reports feeling dizzy but denies palpitations,chest pain,melena,or shortness of breath. Pt denies hitting his head but does report having b/l rib pain where he was held by the aides.In the ED T:97.3 P:70 /72 RR:20 O2:96 on 2L. He was given 500ccbolus and IV tylenol 1G Head CT: (-) Cervical spineCT: (-) CTChest/Abdomen/Pelvis:No evidence of acute intrathoracic or intra-abdominal injury.    Pt lives in a private home with 8 steps to enter home from front, 4 steps to enter on side. Pt enters home via w/c and assist. Pt states he has stair lift to second floor. Pt states he is dependent with ADLs, received bed baths from wife and is only able to ambulate short distances with assist. Pt states he does not use a RW for ambulation.

## 2017-01-19 NOTE — DISCHARGE NOTE ADULT - CARE PROVIDER_API CALL
Sage Landry), Cardiology; Internal Medicine  [address] [place] [address]  Phone: [phone]  Fax: [phone]    Ishan Gaytan), Internal Medicine; Pulmonary Disease  [address] [place] [address] [address]  [place] [address]  Phone: [phone]  Fax: [phone]    Ko Norris), Hematology; Internal Medicine; Medical Oncology  [address] [place] [address]  Phone: [phone]  Fax: [phone]

## 2017-01-19 NOTE — DISCHARGE NOTE ADULT - SECONDARY DIAGNOSIS.
C. difficile diarrhea Chronic kidney disease, unspecified stage Foot ulcer HTN (hypertension) Chronic atrial fibrillation DM (Diabetes Mellitus)

## 2017-01-20 VITALS
OXYGEN SATURATION: 99 % | SYSTOLIC BLOOD PRESSURE: 150 MMHG | RESPIRATION RATE: 18 BRPM | HEART RATE: 74 BPM | DIASTOLIC BLOOD PRESSURE: 72 MMHG | TEMPERATURE: 98 F

## 2017-01-20 LAB
INR BLD: 3.11 RATIO — HIGH (ref 0.88–1.16)
PROTHROM AB SERPL-ACNC: 34 SEC — HIGH (ref 10–13.1)

## 2017-01-20 PROCEDURE — 82550 ASSAY OF CK (CPK): CPT

## 2017-01-20 PROCEDURE — 82330 ASSAY OF CALCIUM: CPT

## 2017-01-20 PROCEDURE — 83036 HEMOGLOBIN GLYCOSYLATED A1C: CPT

## 2017-01-20 PROCEDURE — 87177 OVA AND PARASITES SMEARS: CPT

## 2017-01-20 PROCEDURE — 96374 THER/PROPH/DIAG INJ IV PUSH: CPT

## 2017-01-20 PROCEDURE — 84132 ASSAY OF SERUM POTASSIUM: CPT

## 2017-01-20 PROCEDURE — 82553 CREATINE MB FRACTION: CPT

## 2017-01-20 PROCEDURE — 87015 SPECIMEN INFECT AGNT CONCNTJ: CPT

## 2017-01-20 PROCEDURE — 83735 ASSAY OF MAGNESIUM: CPT

## 2017-01-20 PROCEDURE — 82565 ASSAY OF CREATININE: CPT

## 2017-01-20 PROCEDURE — 70450 CT HEAD/BRAIN W/O DYE: CPT

## 2017-01-20 PROCEDURE — 94640 AIRWAY INHALATION TREATMENT: CPT

## 2017-01-20 PROCEDURE — 80053 COMPREHEN METABOLIC PANEL: CPT

## 2017-01-20 PROCEDURE — 71260 CT THORAX DX C+: CPT

## 2017-01-20 PROCEDURE — 93005 ELECTROCARDIOGRAM TRACING: CPT

## 2017-01-20 PROCEDURE — 82435 ASSAY OF BLOOD CHLORIDE: CPT

## 2017-01-20 PROCEDURE — 87206 SMEAR FLUORESCENT/ACID STAI: CPT

## 2017-01-20 PROCEDURE — 82947 ASSAY GLUCOSE BLOOD QUANT: CPT

## 2017-01-20 PROCEDURE — 87116 MYCOBACTERIA CULTURE: CPT

## 2017-01-20 PROCEDURE — 99285 EMERGENCY DEPT VISIT HI MDM: CPT | Mod: 25

## 2017-01-20 PROCEDURE — 82803 BLOOD GASES ANY COMBINATION: CPT

## 2017-01-20 PROCEDURE — 99233 SBSQ HOSP IP/OBS HIGH 50: CPT

## 2017-01-20 PROCEDURE — 85730 THROMBOPLASTIN TIME PARTIAL: CPT

## 2017-01-20 PROCEDURE — 85014 HEMATOCRIT: CPT

## 2017-01-20 PROCEDURE — 80048 BASIC METABOLIC PNL TOTAL CA: CPT

## 2017-01-20 PROCEDURE — G0378: CPT

## 2017-01-20 PROCEDURE — 97161 PT EVAL LOW COMPLEX 20 MIN: CPT

## 2017-01-20 PROCEDURE — 74177 CT ABD & PELVIS W/CONTRAST: CPT

## 2017-01-20 PROCEDURE — 85610 PROTHROMBIN TIME: CPT

## 2017-01-20 PROCEDURE — 84484 ASSAY OF TROPONIN QUANT: CPT

## 2017-01-20 PROCEDURE — 72125 CT NECK SPINE W/O DYE: CPT

## 2017-01-20 PROCEDURE — 87186 SC STD MICRODIL/AGAR DIL: CPT

## 2017-01-20 PROCEDURE — 84295 ASSAY OF SERUM SODIUM: CPT

## 2017-01-20 PROCEDURE — 81001 URINALYSIS AUTO W/SCOPE: CPT

## 2017-01-20 PROCEDURE — 87493 C DIFF AMPLIFIED PROBE: CPT

## 2017-01-20 PROCEDURE — 85027 COMPLETE CBC AUTOMATED: CPT

## 2017-01-20 PROCEDURE — 97110 THERAPEUTIC EXERCISES: CPT

## 2017-01-20 PROCEDURE — 87086 URINE CULTURE/COLONY COUNT: CPT

## 2017-01-20 PROCEDURE — 97116 GAIT TRAINING THERAPY: CPT

## 2017-01-20 PROCEDURE — 83605 ASSAY OF LACTIC ACID: CPT

## 2017-01-20 RX ORDER — ZALEPLON 10 MG
1 CAPSULE ORAL
Qty: 30 | Refills: 0 | OUTPATIENT
Start: 2017-01-20 | End: 2017-02-19

## 2017-01-20 RX ADMIN — Medication 1: at 11:53

## 2017-01-20 RX ADMIN — Medication 125 MILLIGRAM(S): at 11:38

## 2017-01-20 RX ADMIN — Medication 25 MILLIGRAM(S): at 12:16

## 2017-01-20 RX ADMIN — Medication 25 MILLIGRAM(S): at 05:20

## 2017-01-20 RX ADMIN — AMLODIPINE BESYLATE 5 MILLIGRAM(S): 2.5 TABLET ORAL at 05:20

## 2017-01-20 RX ADMIN — Medication 100 MILLIGRAM(S): at 05:19

## 2017-01-20 RX ADMIN — Medication 50 MILLIGRAM(S): at 05:19

## 2017-01-20 RX ADMIN — Medication 125 MILLIGRAM(S): at 05:20

## 2017-01-20 RX ADMIN — CLOPIDOGREL BISULFATE 75 MILLIGRAM(S): 75 TABLET, FILM COATED ORAL at 11:38

## 2017-01-23 ENCOUNTER — INPATIENT (INPATIENT)
Facility: HOSPITAL | Age: 82
LOS: 7 days | Discharge: HOME | DRG: 314 | End: 2017-01-31
Attending: INTERNAL MEDICINE | Admitting: INTERNAL MEDICINE
Payer: MEDICARE

## 2017-01-23 VITALS
RESPIRATION RATE: 16 BRPM | SYSTOLIC BLOOD PRESSURE: 144 MMHG | OXYGEN SATURATION: 97 % | DIASTOLIC BLOOD PRESSURE: 82 MMHG | HEART RATE: 94 BPM

## 2017-01-23 DIAGNOSIS — L03.90 CELLULITIS, UNSPECIFIED: ICD-10-CM

## 2017-01-23 LAB
ALBUMIN SERPL ELPH-MCNC: 3.5 G/DL — SIGNIFICANT CHANGE UP (ref 3.3–5)
ALP SERPL-CCNC: 63 U/L — SIGNIFICANT CHANGE UP (ref 40–120)
ALT FLD-CCNC: 10 U/L RC — SIGNIFICANT CHANGE UP (ref 10–45)
ANION GAP SERPL CALC-SCNC: 18 MMOL/L — HIGH (ref 5–17)
APTT BLD: 41.3 SEC — HIGH (ref 27.5–37.4)
AST SERPL-CCNC: 29 U/L — SIGNIFICANT CHANGE UP (ref 10–40)
BASOPHILS # BLD AUTO: 0 K/UL — SIGNIFICANT CHANGE UP (ref 0–0.2)
BASOPHILS NFR BLD AUTO: 0.1 % — SIGNIFICANT CHANGE UP (ref 0–2)
BILIRUB SERPL-MCNC: 0.7 MG/DL — SIGNIFICANT CHANGE UP (ref 0.2–1.2)
BUN SERPL-MCNC: 20 MG/DL — SIGNIFICANT CHANGE UP (ref 7–23)
CALCIUM SERPL-MCNC: 9.4 MG/DL — SIGNIFICANT CHANGE UP (ref 8.4–10.5)
CHLORIDE SERPL-SCNC: 89 MMOL/L — LOW (ref 96–108)
CO2 SERPL-SCNC: 21 MMOL/L — LOW (ref 22–31)
CREAT SERPL-MCNC: 1.42 MG/DL — HIGH (ref 0.5–1.3)
EOSINOPHIL # BLD AUTO: 0 K/UL — SIGNIFICANT CHANGE UP (ref 0–0.5)
EOSINOPHIL NFR BLD AUTO: 0.1 % — SIGNIFICANT CHANGE UP (ref 0–6)
GAS PNL BLDV: SIGNIFICANT CHANGE UP
GLUCOSE SERPL-MCNC: 113 MG/DL — HIGH (ref 70–99)
HCT VFR BLD CALC: 30.7 % — LOW (ref 39–50)
HGB BLD-MCNC: 10.8 G/DL — LOW (ref 13–17)
INR BLD: 4.99 RATIO — HIGH (ref 0.88–1.16)
LYMPHOCYTES # BLD AUTO: 1.1 K/UL — SIGNIFICANT CHANGE UP (ref 1–3.3)
LYMPHOCYTES # BLD AUTO: 13.6 % — SIGNIFICANT CHANGE UP (ref 13–44)
MCHC RBC-ENTMCNC: 32.9 PG — SIGNIFICANT CHANGE UP (ref 27–34)
MCHC RBC-ENTMCNC: 35.1 GM/DL — SIGNIFICANT CHANGE UP (ref 32–36)
MCV RBC AUTO: 93.5 FL — SIGNIFICANT CHANGE UP (ref 80–100)
MONOCYTES # BLD AUTO: 0.9 K/UL — SIGNIFICANT CHANGE UP (ref 0–0.9)
MONOCYTES NFR BLD AUTO: 10.4 % — SIGNIFICANT CHANGE UP (ref 2–14)
NEUTROPHILS # BLD AUTO: 6.2 K/UL — SIGNIFICANT CHANGE UP (ref 1.8–7.4)
NEUTROPHILS NFR BLD AUTO: 75.9 % — SIGNIFICANT CHANGE UP (ref 43–77)
PLATELET # BLD AUTO: 240 K/UL — SIGNIFICANT CHANGE UP (ref 150–400)
POTASSIUM SERPL-MCNC: 4.5 MMOL/L — SIGNIFICANT CHANGE UP (ref 3.5–5.3)
POTASSIUM SERPL-SCNC: 4.5 MMOL/L — SIGNIFICANT CHANGE UP (ref 3.5–5.3)
PROT SERPL-MCNC: 6.9 G/DL — SIGNIFICANT CHANGE UP (ref 6–8.3)
PROTHROM AB SERPL-ACNC: 54.8 SEC — HIGH (ref 10–13.1)
RBC # BLD: 3.28 M/UL — LOW (ref 4.2–5.8)
RBC # FLD: 15.4 % — HIGH (ref 10.3–14.5)
SODIUM SERPL-SCNC: 128 MMOL/L — LOW (ref 135–145)
WBC # BLD: 8.2 K/UL — SIGNIFICANT CHANGE UP (ref 3.8–10.5)
WBC # FLD AUTO: 8.2 K/UL — SIGNIFICANT CHANGE UP (ref 3.8–10.5)

## 2017-01-23 PROCEDURE — 93010 ELECTROCARDIOGRAM REPORT: CPT

## 2017-01-23 PROCEDURE — 99285 EMERGENCY DEPT VISIT HI MDM: CPT | Mod: 25

## 2017-01-23 RX ORDER — ENZALUTAMIDE 80 MG/1
160 TABLET ORAL DAILY
Qty: 0 | Refills: 0 | Status: DISCONTINUED | OUTPATIENT
Start: 2017-01-23 | End: 2017-01-25

## 2017-01-23 RX ORDER — CEFAZOLIN SODIUM 1 G
1000 VIAL (EA) INJECTION EVERY 8 HOURS
Qty: 0 | Refills: 0 | Status: DISCONTINUED | OUTPATIENT
Start: 2017-01-23 | End: 2017-01-31

## 2017-01-23 RX ORDER — METOPROLOL TARTRATE 50 MG
50 TABLET ORAL DAILY
Qty: 0 | Refills: 0 | Status: DISCONTINUED | OUTPATIENT
Start: 2017-01-23 | End: 2017-01-31

## 2017-01-23 RX ORDER — VANCOMYCIN HCL 1 G
250 VIAL (EA) INTRAVENOUS EVERY 6 HOURS
Qty: 0 | Refills: 0 | Status: DISCONTINUED | OUTPATIENT
Start: 2017-01-23 | End: 2017-01-31

## 2017-01-23 RX ORDER — CEFAZOLIN SODIUM 1 G
2000 VIAL (EA) INJECTION ONCE
Qty: 0 | Refills: 0 | Status: COMPLETED | OUTPATIENT
Start: 2017-01-23 | End: 2017-01-23

## 2017-01-23 RX ORDER — SODIUM CHLORIDE 9 MG/ML
3 INJECTION INTRAMUSCULAR; INTRAVENOUS; SUBCUTANEOUS ONCE
Qty: 0 | Refills: 0 | Status: COMPLETED | OUTPATIENT
Start: 2017-01-23 | End: 2017-01-23

## 2017-01-23 RX ORDER — VANCOMYCIN HCL 1 G
1000 VIAL (EA) INTRAVENOUS ONCE
Qty: 0 | Refills: 0 | Status: COMPLETED | OUTPATIENT
Start: 2017-01-23 | End: 2017-01-23

## 2017-01-23 RX ORDER — AMLODIPINE BESYLATE 2.5 MG/1
5 TABLET ORAL DAILY
Qty: 0 | Refills: 0 | Status: DISCONTINUED | OUTPATIENT
Start: 2017-01-23 | End: 2017-01-31

## 2017-01-23 RX ORDER — TAMSULOSIN HYDROCHLORIDE 0.4 MG/1
0.4 CAPSULE ORAL AT BEDTIME
Qty: 0 | Refills: 0 | Status: DISCONTINUED | OUTPATIENT
Start: 2017-01-23 | End: 2017-01-31

## 2017-01-23 RX ORDER — SODIUM CHLORIDE 9 MG/ML
1000 INJECTION INTRAMUSCULAR; INTRAVENOUS; SUBCUTANEOUS ONCE
Qty: 0 | Refills: 0 | Status: COMPLETED | OUTPATIENT
Start: 2017-01-23 | End: 2017-01-23

## 2017-01-23 RX ORDER — BUDESONIDE, MICRONIZED 100 %
0.5 POWDER (GRAM) MISCELLANEOUS
Qty: 0 | Refills: 0 | Status: DISCONTINUED | OUTPATIENT
Start: 2017-01-23 | End: 2017-01-31

## 2017-01-23 RX ORDER — MECLIZINE HCL 12.5 MG
25 TABLET ORAL EVERY 6 HOURS
Qty: 0 | Refills: 0 | Status: DISCONTINUED | OUTPATIENT
Start: 2017-01-23 | End: 2017-01-31

## 2017-01-23 RX ORDER — ATORVASTATIN CALCIUM 80 MG/1
40 TABLET, FILM COATED ORAL AT BEDTIME
Qty: 0 | Refills: 0 | Status: DISCONTINUED | OUTPATIENT
Start: 2017-01-23 | End: 2017-01-31

## 2017-01-23 RX ORDER — VANCOMYCIN HCL 1 G
125 VIAL (EA) INTRAVENOUS EVERY 6 HOURS
Qty: 0 | Refills: 0 | Status: DISCONTINUED | OUTPATIENT
Start: 2017-01-23 | End: 2017-01-23

## 2017-01-23 RX ORDER — PANTOPRAZOLE SODIUM 20 MG/1
40 TABLET, DELAYED RELEASE ORAL
Qty: 0 | Refills: 0 | Status: DISCONTINUED | OUTPATIENT
Start: 2017-01-23 | End: 2017-01-31

## 2017-01-23 RX ADMIN — SODIUM CHLORIDE 2000 MILLILITER(S): 9 INJECTION INTRAMUSCULAR; INTRAVENOUS; SUBCUTANEOUS at 20:50

## 2017-01-23 RX ADMIN — Medication 250 MILLIGRAM(S): at 20:50

## 2017-01-23 RX ADMIN — Medication 200 MILLIGRAM(S): at 17:40

## 2017-01-23 RX ADMIN — SODIUM CHLORIDE 3 MILLILITER(S): 9 INJECTION INTRAMUSCULAR; INTRAVENOUS; SUBCUTANEOUS at 19:24

## 2017-01-23 NOTE — ED ADULT NURSE NOTE - OBJECTIVE STATEMENT
pt BIBA from home, pt has cellulitis to left ac s/o IV insertion while in hospital being treated for Cdiff. PT not properly taking PO vanco at home, Pt febrile, left arm warm, red, edmatous. Lungs CTA, abd soft nt, nd, +bs 4 quads MUHAMMAD 2+ pulses

## 2017-01-23 NOTE — H&P ADULT. - HISTORY OF PRESENT ILLNESS
86 yo male extensive pmhx including afib on ac, ckd, chf, prostate ca on chemo, p/w worsening L AC pain and swelling and redness with purulent discharge. Per EMS pt presents to the ED for L AC arm pain and purulent discharge. Pt had recent IV in the arm. Over the past 3 days the AC has been have increased discharge warmth and erythema. +fever last night. Pt has pain in the left AC, denies any other symptoms.   pt with recent admission to hospital for c diff colitis. Diarrhea has improved, pt does not have any abd pain

## 2017-01-23 NOTE — ED PROVIDER NOTE - ATTENDING CONTRIBUTION TO CARE
Patient recently admitted and on ABx for C diff presenting with worsening LUE pain and swelling associated with worsening malaise.  Had IV present at site of pain.    On exam patient febrile, LUE with erythema/edema/warmth/TTP in AC with palpable firmness concerning for cellulitis possibly with abscess.    Will check labs, LUE US to evaluate for drainable collection, ABx and cultures given fever likely readmission.

## 2017-01-23 NOTE — ED PROVIDER NOTE - OBJECTIVE STATEMENT
85 YOM SUBHA presents to ED with worsening L AC pain and swelling and redness with purulent discharge. Per EMS pt presents to the ED for L AC arm pain and purulent discharge. Pt had recent IV in the arm. Over the past 3 days the AC has been have increased discharge warmth and erythema. +fever last night. Pt has pain in the left AC, denies any other symptoms. Diarrhea has improved, pt does not have any abd pain.

## 2017-01-23 NOTE — ED PROVIDER NOTE - MEDICAL DECISION MAKING DETAILS
Pt presents with fever, chills, cellulitis vs abscess to left AC, abd is non-tender. Will get US of soft tissue to look for abscess. Pt needs IV abx and will likely admit pt for IV abx. Will get basic lab work.

## 2017-01-23 NOTE — ED PROCEDURE NOTE - PROCEDURE ADDITIONAL DETAILS
Educational Scan.   Prelim: LUE with no obvious collection.  Peripheral IV inserted under US in RUE.

## 2017-01-23 NOTE — H&P ADULT. - ASSESSMENT
84 yo male history as noted a/w sepsis and left upper cellulitis and rashmi    ID consult  check cultures   iv abx  cont po vanco for c diff  hold lasix   renal f/u  cont other cardiac meds  cont coumadin for afib goal inr 2-3  cont chemo meds

## 2017-01-24 LAB
ANION GAP SERPL CALC-SCNC: 19 MMOL/L — HIGH (ref 5–17)
APTT BLD: 45.6 SEC — HIGH (ref 27.5–37.4)
BLD GP AB SCN SERPL QL: NEGATIVE — SIGNIFICANT CHANGE UP
BUN SERPL-MCNC: 20 MG/DL — SIGNIFICANT CHANGE UP (ref 7–23)
CALCIUM SERPL-MCNC: 8.4 MG/DL — SIGNIFICANT CHANGE UP (ref 8.4–10.5)
CHLORIDE SERPL-SCNC: 92 MMOL/L — LOW (ref 96–108)
CO2 SERPL-SCNC: 17 MMOL/L — LOW (ref 22–31)
CREAT SERPL-MCNC: 1.43 MG/DL — HIGH (ref 0.5–1.3)
GLUCOSE SERPL-MCNC: 119 MG/DL — HIGH (ref 70–99)
GRAM STN FLD: SIGNIFICANT CHANGE UP
HCT VFR BLD CALC: 25 % — LOW (ref 39–50)
HGB BLD-MCNC: 8.6 G/DL — LOW (ref 13–17)
INR BLD: 5.57 RATIO — CRITICAL HIGH (ref 0.88–1.16)
MCHC RBC-ENTMCNC: 31.5 PG — SIGNIFICANT CHANGE UP (ref 27–34)
MCHC RBC-ENTMCNC: 34.4 GM/DL — SIGNIFICANT CHANGE UP (ref 32–36)
MCV RBC AUTO: 91.6 FL — SIGNIFICANT CHANGE UP (ref 80–100)
PLATELET # BLD AUTO: 183 K/UL — SIGNIFICANT CHANGE UP (ref 150–400)
POTASSIUM SERPL-MCNC: 3.9 MMOL/L — SIGNIFICANT CHANGE UP (ref 3.5–5.3)
POTASSIUM SERPL-SCNC: 3.9 MMOL/L — SIGNIFICANT CHANGE UP (ref 3.5–5.3)
PROTHROM AB SERPL-ACNC: 64.1 SEC — HIGH (ref 10–13.1)
RBC # BLD: 2.73 M/UL — LOW (ref 4.2–5.8)
RBC # FLD: 15.8 % — HIGH (ref 10.3–14.5)
RH IG SCN BLD-IMP: POSITIVE — SIGNIFICANT CHANGE UP
SODIUM SERPL-SCNC: 128 MMOL/L — LOW (ref 135–145)
SPECIMEN SOURCE: SIGNIFICANT CHANGE UP
SPECIMEN SOURCE: SIGNIFICANT CHANGE UP
VANCOMYCIN TROUGH SERPL-MCNC: 6.4 UG/ML — LOW (ref 10–20)
WBC # BLD: 5.47 K/UL — SIGNIFICANT CHANGE UP (ref 3.8–10.5)
WBC # FLD AUTO: 5.47 K/UL — SIGNIFICANT CHANGE UP (ref 3.8–10.5)

## 2017-01-24 PROCEDURE — 99223 1ST HOSP IP/OBS HIGH 75: CPT

## 2017-01-24 PROCEDURE — 93971 EXTREMITY STUDY: CPT | Mod: 26

## 2017-01-24 RX ORDER — VANCOMYCIN HCL 1 G
VIAL (EA) INTRAVENOUS
Qty: 0 | Refills: 0 | Status: DISCONTINUED | OUTPATIENT
Start: 2017-01-24 | End: 2017-01-26

## 2017-01-24 RX ORDER — INFLUENZA VIRUS VACCINE 15; 15; 15; 15 UG/.5ML; UG/.5ML; UG/.5ML; UG/.5ML
0.5 SUSPENSION INTRAMUSCULAR ONCE
Qty: 0 | Refills: 0 | Status: COMPLETED | OUTPATIENT
Start: 2017-01-24 | End: 2017-01-31

## 2017-01-24 RX ORDER — ACETAMINOPHEN 500 MG
650 TABLET ORAL EVERY 6 HOURS
Qty: 0 | Refills: 0 | Status: DISCONTINUED | OUTPATIENT
Start: 2017-01-24 | End: 2017-01-31

## 2017-01-24 RX ORDER — VANCOMYCIN HCL 1 G
1000 VIAL (EA) INTRAVENOUS ONCE
Qty: 0 | Refills: 0 | Status: COMPLETED | OUTPATIENT
Start: 2017-01-24 | End: 2017-01-24

## 2017-01-24 RX ORDER — VANCOMYCIN HCL 1 G
1000 VIAL (EA) INTRAVENOUS EVERY 24 HOURS
Qty: 0 | Refills: 0 | Status: DISCONTINUED | OUTPATIENT
Start: 2017-01-25 | End: 2017-01-26

## 2017-01-24 RX ORDER — IBUPROFEN 200 MG
600 TABLET ORAL ONCE
Qty: 0 | Refills: 0 | Status: COMPLETED | OUTPATIENT
Start: 2017-01-24 | End: 2017-01-24

## 2017-01-24 RX ADMIN — Medication 250 MILLIGRAM(S): at 19:53

## 2017-01-24 RX ADMIN — ATORVASTATIN CALCIUM 40 MILLIGRAM(S): 80 TABLET, FILM COATED ORAL at 21:12

## 2017-01-24 RX ADMIN — Medication 25 MILLIGRAM(S): at 12:21

## 2017-01-24 RX ADMIN — PANTOPRAZOLE SODIUM 40 MILLIGRAM(S): 20 TABLET, DELAYED RELEASE ORAL at 07:05

## 2017-01-24 RX ADMIN — Medication 600 MILLIGRAM(S): at 08:20

## 2017-01-24 RX ADMIN — Medication 25 MILLIGRAM(S): at 01:46

## 2017-01-24 RX ADMIN — Medication 250 MILLIGRAM(S): at 01:46

## 2017-01-24 RX ADMIN — Medication 650 MILLIGRAM(S): at 03:08

## 2017-01-24 RX ADMIN — TAMSULOSIN HYDROCHLORIDE 0.4 MILLIGRAM(S): 0.4 CAPSULE ORAL at 21:12

## 2017-01-24 RX ADMIN — Medication 100 MILLIGRAM(S): at 01:30

## 2017-01-24 RX ADMIN — Medication 100 MILLIGRAM(S): at 11:20

## 2017-01-24 RX ADMIN — Medication 100 MILLIGRAM(S): at 18:43

## 2017-01-24 RX ADMIN — Medication 650 MILLIGRAM(S): at 13:54

## 2017-01-24 RX ADMIN — Medication 25 MILLIGRAM(S): at 21:12

## 2017-01-24 RX ADMIN — Medication 250 MILLIGRAM(S): at 12:21

## 2017-01-24 RX ADMIN — Medication 250 MILLIGRAM(S): at 07:05

## 2017-01-25 LAB
ANION GAP SERPL CALC-SCNC: 20 MMOL/L — HIGH (ref 5–17)
APTT BLD: 44.1 SEC — HIGH (ref 27.5–37.4)
BUN SERPL-MCNC: 18 MG/DL — SIGNIFICANT CHANGE UP (ref 7–23)
CALCIUM SERPL-MCNC: 9 MG/DL — SIGNIFICANT CHANGE UP (ref 8.4–10.5)
CHLORIDE SERPL-SCNC: 93 MMOL/L — LOW (ref 96–108)
CO2 SERPL-SCNC: 18 MMOL/L — LOW (ref 22–31)
CREAT SERPL-MCNC: 1.31 MG/DL — HIGH (ref 0.5–1.3)
GLUCOSE SERPL-MCNC: 99 MG/DL — SIGNIFICANT CHANGE UP (ref 70–99)
GRAM STN FLD: SIGNIFICANT CHANGE UP
HCT VFR BLD CALC: 25.6 % — LOW (ref 39–50)
HCT VFR BLD CALC: 28.5 % — LOW (ref 39–50)
HGB BLD-MCNC: 8.6 G/DL — LOW (ref 13–17)
HGB BLD-MCNC: 9.6 G/DL — LOW (ref 13–17)
INR BLD: 5.2 RATIO — CRITICAL HIGH (ref 0.88–1.16)
INR BLD: 7.88 RATIO — CRITICAL HIGH (ref 0.88–1.16)
MCHC RBC-ENTMCNC: 30.7 PG — SIGNIFICANT CHANGE UP (ref 27–34)
MCHC RBC-ENTMCNC: 31.3 PG — SIGNIFICANT CHANGE UP (ref 27–34)
MCHC RBC-ENTMCNC: 33.6 GM/DL — SIGNIFICANT CHANGE UP (ref 32–36)
MCHC RBC-ENTMCNC: 33.7 GM/DL — SIGNIFICANT CHANGE UP (ref 32–36)
MCV RBC AUTO: 91.4 FL — SIGNIFICANT CHANGE UP (ref 80–100)
MCV RBC AUTO: 92.8 FL — SIGNIFICANT CHANGE UP (ref 80–100)
PLATELET # BLD AUTO: 200 K/UL — SIGNIFICANT CHANGE UP (ref 150–400)
PLATELET # BLD AUTO: 219 K/UL — SIGNIFICANT CHANGE UP (ref 150–400)
POTASSIUM SERPL-MCNC: 4.1 MMOL/L — SIGNIFICANT CHANGE UP (ref 3.5–5.3)
POTASSIUM SERPL-SCNC: 4.1 MMOL/L — SIGNIFICANT CHANGE UP (ref 3.5–5.3)
PROTHROM AB SERPL-ACNC: 59.8 SEC — HIGH (ref 10–13.1)
PROTHROM AB SERPL-ACNC: 90.9 SEC — HIGH (ref 10–13.1)
RBC # BLD: 2.8 M/UL — LOW (ref 4.2–5.8)
RBC # BLD: 3.07 M/UL — LOW (ref 4.2–5.8)
RBC # FLD: 15.5 % — HIGH (ref 10.3–14.5)
RBC # FLD: 15.6 % — HIGH (ref 10.3–14.5)
SODIUM SERPL-SCNC: 131 MMOL/L — LOW (ref 135–145)
VANCOMYCIN TROUGH SERPL-MCNC: 13.4 UG/ML — SIGNIFICANT CHANGE UP (ref 10–20)
WBC # BLD: 4.96 K/UL — SIGNIFICANT CHANGE UP (ref 3.8–10.5)
WBC # BLD: 8.2 K/UL — SIGNIFICANT CHANGE UP (ref 3.8–10.5)
WBC # FLD AUTO: 4.96 K/UL — SIGNIFICANT CHANGE UP (ref 3.8–10.5)
WBC # FLD AUTO: 8.2 K/UL — SIGNIFICANT CHANGE UP (ref 3.8–10.5)

## 2017-01-25 PROCEDURE — 99232 SBSQ HOSP IP/OBS MODERATE 35: CPT

## 2017-01-25 PROCEDURE — 99497 ADVNCD CARE PLAN 30 MIN: CPT | Mod: 25,GC

## 2017-01-25 PROCEDURE — 99223 1ST HOSP IP/OBS HIGH 75: CPT | Mod: GC

## 2017-01-25 PROCEDURE — 99233 SBSQ HOSP IP/OBS HIGH 50: CPT

## 2017-01-25 RX ORDER — PHYTONADIONE (VIT K1) 5 MG
5 TABLET ORAL ONCE
Qty: 0 | Refills: 0 | Status: COMPLETED | OUTPATIENT
Start: 2017-01-25 | End: 2017-01-25

## 2017-01-25 RX ORDER — SODIUM CHLORIDE 0.65 %
1 AEROSOL, SPRAY (ML) NASAL THREE TIMES A DAY
Qty: 0 | Refills: 0 | Status: DISCONTINUED | OUTPATIENT
Start: 2017-01-25 | End: 2017-01-31

## 2017-01-25 RX ADMIN — Medication 50 MILLIGRAM(S): at 05:55

## 2017-01-25 RX ADMIN — Medication 25 MILLIGRAM(S): at 05:55

## 2017-01-25 RX ADMIN — Medication 100 MILLIGRAM(S): at 17:56

## 2017-01-25 RX ADMIN — Medication 1 SPRAY(S): at 19:29

## 2017-01-25 RX ADMIN — Medication 250 MILLIGRAM(S): at 12:19

## 2017-01-25 RX ADMIN — Medication 250 MILLIGRAM(S): at 00:08

## 2017-01-25 RX ADMIN — ATORVASTATIN CALCIUM 40 MILLIGRAM(S): 80 TABLET, FILM COATED ORAL at 21:49

## 2017-01-25 RX ADMIN — Medication 250 MILLIGRAM(S): at 18:24

## 2017-01-25 RX ADMIN — PANTOPRAZOLE SODIUM 40 MILLIGRAM(S): 20 TABLET, DELAYED RELEASE ORAL at 05:55

## 2017-01-25 RX ADMIN — Medication 100 MILLIGRAM(S): at 09:46

## 2017-01-25 RX ADMIN — AMLODIPINE BESYLATE 5 MILLIGRAM(S): 2.5 TABLET ORAL at 05:55

## 2017-01-25 RX ADMIN — TAMSULOSIN HYDROCHLORIDE 0.4 MILLIGRAM(S): 0.4 CAPSULE ORAL at 21:49

## 2017-01-25 RX ADMIN — Medication 250 MILLIGRAM(S): at 05:55

## 2017-01-25 RX ADMIN — Medication 250 MILLIGRAM(S): at 17:55

## 2017-01-25 RX ADMIN — Medication 100 MILLIGRAM(S): at 00:08

## 2017-01-25 RX ADMIN — Medication 25 MILLIGRAM(S): at 17:56

## 2017-01-25 RX ADMIN — Medication 25 MILLIGRAM(S): at 12:20

## 2017-01-25 RX ADMIN — Medication 5 MILLIGRAM(S): at 17:55

## 2017-01-25 NOTE — PROVIDER CONTACT NOTE (CRITICAL VALUE NOTIFICATION) - NAME OF MD/NP/PA/DO NOTIFIED:
Kayley Perez, NP
NP Martin Dumont
Sumaya harris, np
URIEL Olmstead
carolina Perez, NP
URIEL Olmstead

## 2017-01-25 NOTE — PROVIDER CONTACT NOTE (CRITICAL VALUE NOTIFICATION) - SITUATION
positive blood cultures: prelimenary fron 1/23/16: growth in aerobic bottle staphacoccus auereus/ anaerobic bottle: gram positive cocci in clusters

## 2017-01-26 LAB
-  AMPICILLIN/SULBACTAM: SIGNIFICANT CHANGE UP
-  CEFAZOLIN: SIGNIFICANT CHANGE UP
-  CIPROFLOXACIN: SIGNIFICANT CHANGE UP
-  CLINDAMYCIN: SIGNIFICANT CHANGE UP
-  ERYTHROMYCIN: SIGNIFICANT CHANGE UP
-  GENTAMICIN: SIGNIFICANT CHANGE UP
-  LEVOFLOXACIN: SIGNIFICANT CHANGE UP
-  MOXIFLOXACIN(AEROBIC): SIGNIFICANT CHANGE UP
-  OXACILLIN: SIGNIFICANT CHANGE UP
-  PENICILLIN: SIGNIFICANT CHANGE UP
-  RIFAMPIN: SIGNIFICANT CHANGE UP
-  TETRACYCLINE: SIGNIFICANT CHANGE UP
-  TRIMETHOPRIM/SULFAMETHOXAZOLE: SIGNIFICANT CHANGE UP
-  VANCOMYCIN: SIGNIFICANT CHANGE UP
ANION GAP SERPL CALC-SCNC: 10 MMOL/L — SIGNIFICANT CHANGE UP (ref 5–17)
BUN SERPL-MCNC: 19 MG/DL — SIGNIFICANT CHANGE UP (ref 7–23)
CALCIUM SERPL-MCNC: 8.5 MG/DL — SIGNIFICANT CHANGE UP (ref 8.4–10.5)
CHLORIDE SERPL-SCNC: 97 MMOL/L — SIGNIFICANT CHANGE UP (ref 96–108)
CO2 SERPL-SCNC: 25 MMOL/L — SIGNIFICANT CHANGE UP (ref 22–31)
CREAT SERPL-MCNC: 1.24 MG/DL — SIGNIFICANT CHANGE UP (ref 0.5–1.3)
CULTURE RESULTS: SIGNIFICANT CHANGE UP
CULTURE RESULTS: SIGNIFICANT CHANGE UP
GLUCOSE SERPL-MCNC: 97 MG/DL — SIGNIFICANT CHANGE UP (ref 70–99)
HCT VFR BLD CALC: 23.9 % — LOW (ref 39–50)
HGB BLD-MCNC: 8.1 G/DL — LOW (ref 13–17)
INR BLD: 1.9 RATIO — HIGH (ref 0.88–1.16)
MCHC RBC-ENTMCNC: 31 PG — SIGNIFICANT CHANGE UP (ref 27–34)
MCHC RBC-ENTMCNC: 33.9 GM/DL — SIGNIFICANT CHANGE UP (ref 32–36)
MCV RBC AUTO: 91.6 FL — SIGNIFICANT CHANGE UP (ref 80–100)
METHOD TYPE: SIGNIFICANT CHANGE UP
ORGANISM # SPEC MICROSCOPIC CNT: SIGNIFICANT CHANGE UP
ORGANISM # SPEC MICROSCOPIC CNT: SIGNIFICANT CHANGE UP
PLATELET # BLD AUTO: 193 K/UL — SIGNIFICANT CHANGE UP (ref 150–400)
POTASSIUM SERPL-MCNC: 4 MMOL/L — SIGNIFICANT CHANGE UP (ref 3.5–5.3)
POTASSIUM SERPL-SCNC: 4 MMOL/L — SIGNIFICANT CHANGE UP (ref 3.5–5.3)
PROTHROM AB SERPL-ACNC: 21.6 SEC — HIGH (ref 10–13.1)
RBC # BLD: 2.61 M/UL — LOW (ref 4.2–5.8)
RBC # FLD: 15.5 % — HIGH (ref 10.3–14.5)
SODIUM SERPL-SCNC: 132 MMOL/L — LOW (ref 135–145)
SPECIMEN SOURCE: SIGNIFICANT CHANGE UP
SPECIMEN SOURCE: SIGNIFICANT CHANGE UP
WBC # BLD: 5.62 K/UL — SIGNIFICANT CHANGE UP (ref 3.8–10.5)
WBC # FLD AUTO: 5.62 K/UL — SIGNIFICANT CHANGE UP (ref 3.8–10.5)

## 2017-01-26 PROCEDURE — 99223 1ST HOSP IP/OBS HIGH 75: CPT

## 2017-01-26 PROCEDURE — 99233 SBSQ HOSP IP/OBS HIGH 50: CPT

## 2017-01-26 RX ORDER — WARFARIN SODIUM 2.5 MG/1
1 TABLET ORAL ONCE
Qty: 0 | Refills: 0 | Status: COMPLETED | OUTPATIENT
Start: 2017-01-26 | End: 2017-01-26

## 2017-01-26 RX ADMIN — Medication 250 MILLIGRAM(S): at 17:03

## 2017-01-26 RX ADMIN — Medication 250 MILLIGRAM(S): at 13:13

## 2017-01-26 RX ADMIN — Medication 25 MILLIGRAM(S): at 17:03

## 2017-01-26 RX ADMIN — Medication 250 MILLIGRAM(S): at 07:17

## 2017-01-26 RX ADMIN — AMLODIPINE BESYLATE 5 MILLIGRAM(S): 2.5 TABLET ORAL at 07:17

## 2017-01-26 RX ADMIN — Medication 25 MILLIGRAM(S): at 13:13

## 2017-01-26 RX ADMIN — TAMSULOSIN HYDROCHLORIDE 0.4 MILLIGRAM(S): 0.4 CAPSULE ORAL at 22:44

## 2017-01-26 RX ADMIN — WARFARIN SODIUM 1 MILLIGRAM(S): 2.5 TABLET ORAL at 22:44

## 2017-01-26 RX ADMIN — ATORVASTATIN CALCIUM 40 MILLIGRAM(S): 80 TABLET, FILM COATED ORAL at 22:44

## 2017-01-26 RX ADMIN — Medication 25 MILLIGRAM(S): at 00:21

## 2017-01-26 RX ADMIN — Medication 250 MILLIGRAM(S): at 00:21

## 2017-01-26 RX ADMIN — Medication 100 MILLIGRAM(S): at 10:03

## 2017-01-26 RX ADMIN — Medication 50 MILLIGRAM(S): at 07:17

## 2017-01-26 RX ADMIN — Medication 100 MILLIGRAM(S): at 01:19

## 2017-01-26 RX ADMIN — Medication 100 MILLIGRAM(S): at 17:03

## 2017-01-26 RX ADMIN — Medication 25 MILLIGRAM(S): at 07:17

## 2017-01-26 RX ADMIN — PANTOPRAZOLE SODIUM 40 MILLIGRAM(S): 20 TABLET, DELAYED RELEASE ORAL at 07:17

## 2017-01-27 LAB
ANION GAP SERPL CALC-SCNC: 14 MMOL/L — SIGNIFICANT CHANGE UP (ref 5–17)
BUN SERPL-MCNC: 18 MG/DL — SIGNIFICANT CHANGE UP (ref 7–23)
CALCIUM SERPL-MCNC: 8.5 MG/DL — SIGNIFICANT CHANGE UP (ref 8.4–10.5)
CHLORIDE SERPL-SCNC: 97 MMOL/L — SIGNIFICANT CHANGE UP (ref 96–108)
CO2 SERPL-SCNC: 23 MMOL/L — SIGNIFICANT CHANGE UP (ref 22–31)
CREAT SERPL-MCNC: 1.06 MG/DL — SIGNIFICANT CHANGE UP (ref 0.5–1.3)
GLUCOSE SERPL-MCNC: 115 MG/DL — HIGH (ref 70–99)
HCT VFR BLD CALC: 25.1 % — LOW (ref 39–50)
HGB BLD-MCNC: 8.1 G/DL — LOW (ref 13–17)
INR BLD: 1.36 RATIO — HIGH (ref 0.88–1.16)
MCHC RBC-ENTMCNC: 30.4 PG — SIGNIFICANT CHANGE UP (ref 27–34)
MCHC RBC-ENTMCNC: 32.3 GM/DL — SIGNIFICANT CHANGE UP (ref 32–36)
MCV RBC AUTO: 94.3 FL — SIGNIFICANT CHANGE UP (ref 80–100)
PLATELET # BLD AUTO: 207 K/UL — SIGNIFICANT CHANGE UP (ref 150–400)
POTASSIUM SERPL-MCNC: 4.1 MMOL/L — SIGNIFICANT CHANGE UP (ref 3.5–5.3)
POTASSIUM SERPL-SCNC: 4.1 MMOL/L — SIGNIFICANT CHANGE UP (ref 3.5–5.3)
PROTHROM AB SERPL-ACNC: 14.9 SEC — HIGH (ref 10–13.1)
RBC # BLD: 2.67 M/UL — LOW (ref 4.2–5.8)
RBC # FLD: 15.3 % — HIGH (ref 10.3–14.5)
SODIUM SERPL-SCNC: 134 MMOL/L — LOW (ref 135–145)
WBC # BLD: 6.4 K/UL — SIGNIFICANT CHANGE UP (ref 3.8–10.5)
WBC # FLD AUTO: 6.4 K/UL — SIGNIFICANT CHANGE UP (ref 3.8–10.5)

## 2017-01-27 RX ORDER — WARFARIN SODIUM 2.5 MG/1
3 TABLET ORAL ONCE
Qty: 0 | Refills: 0 | Status: COMPLETED | OUTPATIENT
Start: 2017-01-27 | End: 2017-01-27

## 2017-01-27 RX ORDER — LACTOBACILLUS ACIDOPHILUS 100MM CELL
1 CAPSULE ORAL
Qty: 0 | Refills: 0 | Status: DISCONTINUED | OUTPATIENT
Start: 2017-01-27 | End: 2017-01-31

## 2017-01-27 RX ADMIN — AMLODIPINE BESYLATE 5 MILLIGRAM(S): 2.5 TABLET ORAL at 05:52

## 2017-01-27 RX ADMIN — Medication 250 MILLIGRAM(S): at 01:24

## 2017-01-27 RX ADMIN — ATORVASTATIN CALCIUM 40 MILLIGRAM(S): 80 TABLET, FILM COATED ORAL at 21:50

## 2017-01-27 RX ADMIN — Medication 250 MILLIGRAM(S): at 17:02

## 2017-01-27 RX ADMIN — Medication 250 MILLIGRAM(S): at 05:52

## 2017-01-27 RX ADMIN — Medication 25 MILLIGRAM(S): at 05:52

## 2017-01-27 RX ADMIN — Medication 25 MILLIGRAM(S): at 17:02

## 2017-01-27 RX ADMIN — Medication 50 MILLIGRAM(S): at 05:52

## 2017-01-27 RX ADMIN — Medication 100 MILLIGRAM(S): at 01:29

## 2017-01-27 RX ADMIN — Medication 100 MILLIGRAM(S): at 11:27

## 2017-01-27 RX ADMIN — Medication 100 MILLIGRAM(S): at 23:36

## 2017-01-27 RX ADMIN — WARFARIN SODIUM 3 MILLIGRAM(S): 2.5 TABLET ORAL at 21:50

## 2017-01-27 RX ADMIN — Medication 100 MILLIGRAM(S): at 17:02

## 2017-01-27 RX ADMIN — Medication 250 MILLIGRAM(S): at 23:36

## 2017-01-27 RX ADMIN — Medication 1 TABLET(S): at 17:02

## 2017-01-27 RX ADMIN — Medication 25 MILLIGRAM(S): at 21:50

## 2017-01-27 RX ADMIN — Medication 25 MILLIGRAM(S): at 01:24

## 2017-01-27 RX ADMIN — PANTOPRAZOLE SODIUM 40 MILLIGRAM(S): 20 TABLET, DELAYED RELEASE ORAL at 06:09

## 2017-01-27 RX ADMIN — Medication 250 MILLIGRAM(S): at 13:17

## 2017-01-27 RX ADMIN — Medication 25 MILLIGRAM(S): at 13:17

## 2017-01-27 RX ADMIN — TAMSULOSIN HYDROCHLORIDE 0.4 MILLIGRAM(S): 0.4 CAPSULE ORAL at 21:50

## 2017-01-28 LAB
ANION GAP SERPL CALC-SCNC: 15 MMOL/L — SIGNIFICANT CHANGE UP (ref 5–17)
BUN SERPL-MCNC: 17 MG/DL — SIGNIFICANT CHANGE UP (ref 7–23)
CALCIUM SERPL-MCNC: 8.7 MG/DL — SIGNIFICANT CHANGE UP (ref 8.4–10.5)
CHLORIDE SERPL-SCNC: 97 MMOL/L — SIGNIFICANT CHANGE UP (ref 96–108)
CO2 SERPL-SCNC: 21 MMOL/L — LOW (ref 22–31)
CREAT SERPL-MCNC: 1.03 MG/DL — SIGNIFICANT CHANGE UP (ref 0.5–1.3)
GLUCOSE SERPL-MCNC: 98 MG/DL — SIGNIFICANT CHANGE UP (ref 70–99)
HCT VFR BLD CALC: 24.5 % — LOW (ref 39–50)
HGB BLD-MCNC: 8 G/DL — LOW (ref 13–17)
INR BLD: 1.25 RATIO — HIGH (ref 0.88–1.16)
MCHC RBC-ENTMCNC: 31.1 PG — SIGNIFICANT CHANGE UP (ref 27–34)
MCHC RBC-ENTMCNC: 32.7 GM/DL — SIGNIFICANT CHANGE UP (ref 32–36)
MCV RBC AUTO: 95.3 FL — SIGNIFICANT CHANGE UP (ref 80–100)
PLATELET # BLD AUTO: 235 K/UL — SIGNIFICANT CHANGE UP (ref 150–400)
POTASSIUM SERPL-MCNC: 4.3 MMOL/L — SIGNIFICANT CHANGE UP (ref 3.5–5.3)
POTASSIUM SERPL-SCNC: 4.3 MMOL/L — SIGNIFICANT CHANGE UP (ref 3.5–5.3)
PROTHROM AB SERPL-ACNC: 14.1 SEC — HIGH (ref 10–13.1)
RBC # BLD: 2.57 M/UL — LOW (ref 4.2–5.8)
RBC # FLD: 15.2 % — HIGH (ref 10.3–14.5)
SODIUM SERPL-SCNC: 133 MMOL/L — LOW (ref 135–145)
WBC # BLD: 5.9 K/UL — SIGNIFICANT CHANGE UP (ref 3.8–10.5)
WBC # FLD AUTO: 5.9 K/UL — SIGNIFICANT CHANGE UP (ref 3.8–10.5)

## 2017-01-28 RX ORDER — DEXTROSE 50 % IN WATER 50 %
25 SYRINGE (ML) INTRAVENOUS ONCE
Qty: 0 | Refills: 0 | Status: DISCONTINUED | OUTPATIENT
Start: 2017-01-28 | End: 2017-01-31

## 2017-01-28 RX ORDER — SODIUM CHLORIDE 9 MG/ML
1000 INJECTION, SOLUTION INTRAVENOUS
Qty: 0 | Refills: 0 | Status: DISCONTINUED | OUTPATIENT
Start: 2017-01-28 | End: 2017-01-31

## 2017-01-28 RX ORDER — GLUCAGON INJECTION, SOLUTION 0.5 MG/.1ML
1 INJECTION, SOLUTION SUBCUTANEOUS ONCE
Qty: 0 | Refills: 0 | Status: DISCONTINUED | OUTPATIENT
Start: 2017-01-28 | End: 2017-01-31

## 2017-01-28 RX ORDER — DEXTROSE 50 % IN WATER 50 %
1 SYRINGE (ML) INTRAVENOUS ONCE
Qty: 0 | Refills: 0 | Status: DISCONTINUED | OUTPATIENT
Start: 2017-01-28 | End: 2017-01-31

## 2017-01-28 RX ORDER — DEXTROSE 50 % IN WATER 50 %
12.5 SYRINGE (ML) INTRAVENOUS ONCE
Qty: 0 | Refills: 0 | Status: DISCONTINUED | OUTPATIENT
Start: 2017-01-28 | End: 2017-01-31

## 2017-01-28 RX ORDER — INSULIN LISPRO 100/ML
VIAL (ML) SUBCUTANEOUS
Qty: 0 | Refills: 0 | Status: DISCONTINUED | OUTPATIENT
Start: 2017-01-28 | End: 2017-01-31

## 2017-01-28 RX ORDER — WARFARIN SODIUM 2.5 MG/1
3 TABLET ORAL ONCE
Qty: 0 | Refills: 0 | Status: COMPLETED | OUTPATIENT
Start: 2017-01-28 | End: 2017-01-28

## 2017-01-28 RX ADMIN — Medication 250 MILLIGRAM(S): at 12:11

## 2017-01-28 RX ADMIN — Medication 25 MILLIGRAM(S): at 06:02

## 2017-01-28 RX ADMIN — ATORVASTATIN CALCIUM 40 MILLIGRAM(S): 80 TABLET, FILM COATED ORAL at 21:29

## 2017-01-28 RX ADMIN — Medication 100 MILLIGRAM(S): at 17:46

## 2017-01-28 RX ADMIN — Medication 25 MILLIGRAM(S): at 17:46

## 2017-01-28 RX ADMIN — AMLODIPINE BESYLATE 5 MILLIGRAM(S): 2.5 TABLET ORAL at 06:02

## 2017-01-28 RX ADMIN — Medication 1: at 14:03

## 2017-01-28 RX ADMIN — WARFARIN SODIUM 3 MILLIGRAM(S): 2.5 TABLET ORAL at 21:29

## 2017-01-28 RX ADMIN — Medication 100 MILLIGRAM(S): at 08:43

## 2017-01-28 RX ADMIN — Medication 1 TABLET(S): at 08:43

## 2017-01-28 RX ADMIN — Medication 25 MILLIGRAM(S): at 12:11

## 2017-01-28 RX ADMIN — PANTOPRAZOLE SODIUM 40 MILLIGRAM(S): 20 TABLET, DELAYED RELEASE ORAL at 06:02

## 2017-01-28 RX ADMIN — Medication 1 TABLET(S): at 17:46

## 2017-01-28 RX ADMIN — Medication 250 MILLIGRAM(S): at 06:02

## 2017-01-28 RX ADMIN — Medication 250 MILLIGRAM(S): at 17:46

## 2017-01-28 RX ADMIN — Medication 50 MILLIGRAM(S): at 06:02

## 2017-01-28 RX ADMIN — TAMSULOSIN HYDROCHLORIDE 0.4 MILLIGRAM(S): 0.4 CAPSULE ORAL at 21:29

## 2017-01-29 LAB
ANION GAP SERPL CALC-SCNC: 18 MMOL/L — HIGH (ref 5–17)
BUN SERPL-MCNC: 16 MG/DL — SIGNIFICANT CHANGE UP (ref 7–23)
CALCIUM SERPL-MCNC: 9.1 MG/DL — SIGNIFICANT CHANGE UP (ref 8.4–10.5)
CHLORIDE SERPL-SCNC: 99 MMOL/L — SIGNIFICANT CHANGE UP (ref 96–108)
CO2 SERPL-SCNC: 18 MMOL/L — LOW (ref 22–31)
CREAT SERPL-MCNC: 1.03 MG/DL — SIGNIFICANT CHANGE UP (ref 0.5–1.3)
GLUCOSE SERPL-MCNC: 74 MG/DL — SIGNIFICANT CHANGE UP (ref 70–99)
HCT VFR BLD CALC: 26.5 % — LOW (ref 39–50)
HGB BLD-MCNC: 8.6 G/DL — LOW (ref 13–17)
INR BLD: 1.26 RATIO — HIGH (ref 0.88–1.16)
MCHC RBC-ENTMCNC: 30.9 PG — SIGNIFICANT CHANGE UP (ref 27–34)
MCHC RBC-ENTMCNC: 32.5 GM/DL — SIGNIFICANT CHANGE UP (ref 32–36)
MCV RBC AUTO: 95.3 FL — SIGNIFICANT CHANGE UP (ref 80–100)
PLATELET # BLD AUTO: 258 K/UL — SIGNIFICANT CHANGE UP (ref 150–400)
POTASSIUM SERPL-MCNC: 5.2 MMOL/L — SIGNIFICANT CHANGE UP (ref 3.5–5.3)
POTASSIUM SERPL-SCNC: 5.2 MMOL/L — SIGNIFICANT CHANGE UP (ref 3.5–5.3)
PROTHROM AB SERPL-ACNC: 14.3 SEC — HIGH (ref 10–13.1)
RBC # BLD: 2.78 M/UL — LOW (ref 4.2–5.8)
RBC # FLD: 15.5 % — HIGH (ref 10.3–14.5)
SODIUM SERPL-SCNC: 135 MMOL/L — SIGNIFICANT CHANGE UP (ref 135–145)
WBC # BLD: 6.65 K/UL — SIGNIFICANT CHANGE UP (ref 3.8–10.5)
WBC # FLD AUTO: 6.65 K/UL — SIGNIFICANT CHANGE UP (ref 3.8–10.5)

## 2017-01-29 RX ORDER — WARFARIN SODIUM 2.5 MG/1
3 TABLET ORAL ONCE
Qty: 0 | Refills: 0 | Status: COMPLETED | OUTPATIENT
Start: 2017-01-29 | End: 2017-01-29

## 2017-01-29 RX ADMIN — Medication 250 MILLIGRAM(S): at 17:19

## 2017-01-29 RX ADMIN — Medication 250 MILLIGRAM(S): at 05:46

## 2017-01-29 RX ADMIN — Medication 100 MILLIGRAM(S): at 17:19

## 2017-01-29 RX ADMIN — PANTOPRAZOLE SODIUM 40 MILLIGRAM(S): 20 TABLET, DELAYED RELEASE ORAL at 05:45

## 2017-01-29 RX ADMIN — Medication 1 TABLET(S): at 07:10

## 2017-01-29 RX ADMIN — Medication 1 SPRAY(S): at 07:10

## 2017-01-29 RX ADMIN — Medication 250 MILLIGRAM(S): at 00:31

## 2017-01-29 RX ADMIN — Medication 100 MILLIGRAM(S): at 09:05

## 2017-01-29 RX ADMIN — ATORVASTATIN CALCIUM 40 MILLIGRAM(S): 80 TABLET, FILM COATED ORAL at 21:39

## 2017-01-29 RX ADMIN — Medication 25 MILLIGRAM(S): at 05:45

## 2017-01-29 RX ADMIN — AMLODIPINE BESYLATE 5 MILLIGRAM(S): 2.5 TABLET ORAL at 05:45

## 2017-01-29 RX ADMIN — Medication 25 MILLIGRAM(S): at 12:44

## 2017-01-29 RX ADMIN — Medication 250 MILLIGRAM(S): at 12:44

## 2017-01-29 RX ADMIN — Medication 100 MILLIGRAM(S): at 00:32

## 2017-01-29 RX ADMIN — TAMSULOSIN HYDROCHLORIDE 0.4 MILLIGRAM(S): 0.4 CAPSULE ORAL at 21:40

## 2017-01-29 RX ADMIN — WARFARIN SODIUM 3 MILLIGRAM(S): 2.5 TABLET ORAL at 21:39

## 2017-01-29 RX ADMIN — Medication 25 MILLIGRAM(S): at 00:31

## 2017-01-29 RX ADMIN — Medication 25 MILLIGRAM(S): at 17:19

## 2017-01-29 RX ADMIN — Medication 50 MILLIGRAM(S): at 05:45

## 2017-01-29 RX ADMIN — Medication 0.5 MILLIGRAM(S): at 05:45

## 2017-01-29 RX ADMIN — Medication 1 TABLET(S): at 17:19

## 2017-01-30 LAB
ANION GAP SERPL CALC-SCNC: 13 MMOL/L — SIGNIFICANT CHANGE UP (ref 5–17)
BUN SERPL-MCNC: 14 MG/DL — SIGNIFICANT CHANGE UP (ref 7–23)
CALCIUM SERPL-MCNC: 9 MG/DL — SIGNIFICANT CHANGE UP (ref 8.4–10.5)
CHLORIDE SERPL-SCNC: 99 MMOL/L — SIGNIFICANT CHANGE UP (ref 96–108)
CO2 SERPL-SCNC: 23 MMOL/L — SIGNIFICANT CHANGE UP (ref 22–31)
CREAT SERPL-MCNC: 1.02 MG/DL — SIGNIFICANT CHANGE UP (ref 0.5–1.3)
CULTURE RESULTS: SIGNIFICANT CHANGE UP
CULTURE RESULTS: SIGNIFICANT CHANGE UP
GLUCOSE SERPL-MCNC: 101 MG/DL — HIGH (ref 70–99)
HCT VFR BLD CALC: 24.7 % — LOW (ref 39–50)
HGB BLD-MCNC: 8.1 G/DL — LOW (ref 13–17)
INR BLD: 2.31 RATIO — HIGH (ref 0.88–1.16)
MCHC RBC-ENTMCNC: 31.2 PG — SIGNIFICANT CHANGE UP (ref 27–34)
MCHC RBC-ENTMCNC: 32.8 GM/DL — SIGNIFICANT CHANGE UP (ref 32–36)
MCV RBC AUTO: 95 FL — SIGNIFICANT CHANGE UP (ref 80–100)
PLATELET # BLD AUTO: 273 K/UL — SIGNIFICANT CHANGE UP (ref 150–400)
POTASSIUM SERPL-MCNC: 4.5 MMOL/L — SIGNIFICANT CHANGE UP (ref 3.5–5.3)
POTASSIUM SERPL-SCNC: 4.5 MMOL/L — SIGNIFICANT CHANGE UP (ref 3.5–5.3)
PROTHROM AB SERPL-ACNC: 26.3 SEC — HIGH (ref 10–13.1)
RBC # BLD: 2.6 M/UL — LOW (ref 4.2–5.8)
RBC # FLD: 15.5 % — HIGH (ref 10.3–14.5)
SODIUM SERPL-SCNC: 135 MMOL/L — SIGNIFICANT CHANGE UP (ref 135–145)
SPECIMEN SOURCE: SIGNIFICANT CHANGE UP
SPECIMEN SOURCE: SIGNIFICANT CHANGE UP
WBC # BLD: 6.14 K/UL — SIGNIFICANT CHANGE UP (ref 3.8–10.5)
WBC # FLD AUTO: 6.14 K/UL — SIGNIFICANT CHANGE UP (ref 3.8–10.5)

## 2017-01-30 PROCEDURE — 99233 SBSQ HOSP IP/OBS HIGH 50: CPT

## 2017-01-30 RX ORDER — WARFARIN SODIUM 2.5 MG/1
5 TABLET ORAL ONCE
Qty: 0 | Refills: 0 | Status: DISCONTINUED | OUTPATIENT
Start: 2017-01-30 | End: 2017-01-30

## 2017-01-30 RX ORDER — WARFARIN SODIUM 2.5 MG/1
2 TABLET ORAL ONCE
Qty: 0 | Refills: 0 | Status: COMPLETED | OUTPATIENT
Start: 2017-01-30 | End: 2017-01-30

## 2017-01-30 RX ADMIN — AMLODIPINE BESYLATE 5 MILLIGRAM(S): 2.5 TABLET ORAL at 06:30

## 2017-01-30 RX ADMIN — Medication 250 MILLIGRAM(S): at 01:37

## 2017-01-30 RX ADMIN — Medication 1 TABLET(S): at 09:26

## 2017-01-30 RX ADMIN — Medication 25 MILLIGRAM(S): at 17:12

## 2017-01-30 RX ADMIN — Medication 1 SPRAY(S): at 16:13

## 2017-01-30 RX ADMIN — Medication 100 MILLIGRAM(S): at 16:14

## 2017-01-30 RX ADMIN — Medication 25 MILLIGRAM(S): at 01:37

## 2017-01-30 RX ADMIN — Medication 250 MILLIGRAM(S): at 13:15

## 2017-01-30 RX ADMIN — Medication 25 MILLIGRAM(S): at 13:15

## 2017-01-30 RX ADMIN — WARFARIN SODIUM 2 MILLIGRAM(S): 2.5 TABLET ORAL at 22:31

## 2017-01-30 RX ADMIN — Medication 250 MILLIGRAM(S): at 17:12

## 2017-01-30 RX ADMIN — Medication 100 MILLIGRAM(S): at 09:26

## 2017-01-30 RX ADMIN — ATORVASTATIN CALCIUM 40 MILLIGRAM(S): 80 TABLET, FILM COATED ORAL at 22:31

## 2017-01-30 RX ADMIN — Medication 1 TABLET(S): at 16:14

## 2017-01-30 RX ADMIN — Medication 0.5 MILLIGRAM(S): at 06:31

## 2017-01-30 RX ADMIN — TAMSULOSIN HYDROCHLORIDE 0.4 MILLIGRAM(S): 0.4 CAPSULE ORAL at 22:31

## 2017-01-30 RX ADMIN — Medication 250 MILLIGRAM(S): at 06:30

## 2017-01-30 RX ADMIN — Medication 50 MILLIGRAM(S): at 06:30

## 2017-01-30 RX ADMIN — Medication 25 MILLIGRAM(S): at 06:30

## 2017-01-30 RX ADMIN — Medication 100 MILLIGRAM(S): at 01:37

## 2017-01-30 RX ADMIN — PANTOPRAZOLE SODIUM 40 MILLIGRAM(S): 20 TABLET, DELAYED RELEASE ORAL at 06:30

## 2017-01-30 RX ADMIN — Medication 1: at 17:12

## 2017-01-30 NOTE — DIETITIAN INITIAL EVALUATION ADULT. - ENERGY NEEDS
IBW= 154 lbs +/- 10%   used for protein estimation IBW= 154 lbs +/- 10%   used for protein estimation  Admitted with cellulitis/phlebitis in arm, gram + bacteremia.

## 2017-01-30 NOTE — DIETITIAN INITIAL EVALUATION ADULT. - ORAL INTAKE PTA
fair/over past 3 weeeks pt became worried about swallowing secondary to large "chemo pills he had to take" as stated by his wife over past 3 weeks pt became worried about swallowing secondary to large "chemo pills he had to take" as stated by his wife/fair

## 2017-01-30 NOTE — DIETITIAN INITIAL EVALUATION ADULT. - OTHER INFO
pt seen for length of stay. Per pt's wife , pt was eating regular consistency food up until 3 weeks ago. Pt now dissatisfied with puree diet, requesting upgrade to chopped diet.  Also pt states that Glucerna shake causes him diarrhea. pt seen for length of stay. Per pt's wife, pt was eating regular consistency food at home up until 3 weeks ago. Pt now dissatisfied with puree diet, requesting upgrade to chopped diet.  Also pt states that Glucerna shake causes him diarrhea.

## 2017-01-31 ENCOUNTER — TRANSCRIPTION ENCOUNTER (OUTPATIENT)
Age: 82
End: 2017-01-31

## 2017-01-31 VITALS — WEIGHT: 179.9 LBS

## 2017-01-31 LAB
INR BLD: 3.98 RATIO — HIGH (ref 0.88–1.16)
PROTHROM AB SERPL-ACNC: 44 SEC — HIGH (ref 10–13.1)

## 2017-01-31 PROCEDURE — 84132 ASSAY OF SERUM POTASSIUM: CPT

## 2017-01-31 PROCEDURE — 82803 BLOOD GASES ANY COMBINATION: CPT

## 2017-01-31 PROCEDURE — 82947 ASSAY GLUCOSE BLOOD QUANT: CPT

## 2017-01-31 PROCEDURE — 82330 ASSAY OF CALCIUM: CPT

## 2017-01-31 PROCEDURE — 84295 ASSAY OF SERUM SODIUM: CPT

## 2017-01-31 PROCEDURE — 93005 ELECTROCARDIOGRAM TRACING: CPT

## 2017-01-31 PROCEDURE — 87040 BLOOD CULTURE FOR BACTERIA: CPT

## 2017-01-31 PROCEDURE — 96374 THER/PROPH/DIAG INJ IV PUSH: CPT

## 2017-01-31 PROCEDURE — 99285 EMERGENCY DEPT VISIT HI MDM: CPT | Mod: 25

## 2017-01-31 PROCEDURE — 85014 HEMATOCRIT: CPT

## 2017-01-31 PROCEDURE — 80202 ASSAY OF VANCOMYCIN: CPT

## 2017-01-31 PROCEDURE — 93971 EXTREMITY STUDY: CPT

## 2017-01-31 PROCEDURE — 86901 BLOOD TYPING SEROLOGIC RH(D): CPT

## 2017-01-31 PROCEDURE — 80053 COMPREHEN METABOLIC PANEL: CPT

## 2017-01-31 PROCEDURE — 85730 THROMBOPLASTIN TIME PARTIAL: CPT

## 2017-01-31 PROCEDURE — 85610 PROTHROMBIN TIME: CPT

## 2017-01-31 PROCEDURE — 85027 COMPLETE CBC AUTOMATED: CPT

## 2017-01-31 PROCEDURE — 82435 ASSAY OF BLOOD CHLORIDE: CPT

## 2017-01-31 PROCEDURE — 80048 BASIC METABOLIC PNL TOTAL CA: CPT

## 2017-01-31 PROCEDURE — 86850 RBC ANTIBODY SCREEN: CPT

## 2017-01-31 PROCEDURE — 83605 ASSAY OF LACTIC ACID: CPT

## 2017-01-31 PROCEDURE — 94640 AIRWAY INHALATION TREATMENT: CPT

## 2017-01-31 PROCEDURE — 90686 IIV4 VACC NO PRSV 0.5 ML IM: CPT

## 2017-01-31 PROCEDURE — 87186 SC STD MICRODIL/AGAR DIL: CPT

## 2017-01-31 PROCEDURE — 86900 BLOOD TYPING SEROLOGIC ABO: CPT

## 2017-01-31 RX ORDER — CEPHALEXIN 500 MG
1 CAPSULE ORAL
Qty: 84 | Refills: 0 | OUTPATIENT
Start: 2017-01-31 | End: 2017-02-21

## 2017-01-31 RX ORDER — LACTOBACILLUS ACIDOPHILUS 100MM CELL
1 CAPSULE ORAL
Qty: 42 | Refills: 0 | OUTPATIENT
Start: 2017-01-31 | End: 2017-02-21

## 2017-01-31 RX ORDER — CEPHALEXIN 500 MG
1 CAPSULE ORAL
Qty: 40 | Refills: 0 | OUTPATIENT
Start: 2017-01-31 | End: 2017-02-10

## 2017-01-31 RX ORDER — LACTOBACILLUS ACIDOPHILUS 100MM CELL
1 CAPSULE ORAL
Qty: 0 | Refills: 0 | COMMUNITY
Start: 2017-01-31

## 2017-01-31 RX ORDER — VANCOMYCIN HCL 1 G
2.5 VIAL (EA) INTRAVENOUS
Qty: 210 | Refills: 0 | OUTPATIENT
Start: 2017-01-31 | End: 2017-02-21

## 2017-01-31 RX ORDER — NYSTATIN CREAM 100000 [USP'U]/G
1 CREAM TOPICAL
Qty: 0 | Refills: 0 | Status: DISCONTINUED | OUTPATIENT
Start: 2017-01-31 | End: 2017-01-31

## 2017-01-31 RX ORDER — NYSTATIN CREAM 100000 [USP'U]/G
1 CREAM TOPICAL
Qty: 1 | Refills: 0 | OUTPATIENT
Start: 2017-01-31 | End: 2017-02-15

## 2017-01-31 RX ADMIN — Medication 25 MILLIGRAM(S): at 00:48

## 2017-01-31 RX ADMIN — Medication 1 TABLET(S): at 09:14

## 2017-01-31 RX ADMIN — PANTOPRAZOLE SODIUM 40 MILLIGRAM(S): 20 TABLET, DELAYED RELEASE ORAL at 05:16

## 2017-01-31 RX ADMIN — INFLUENZA VIRUS VACCINE 0.5 MILLILITER(S): 15; 15; 15; 15 SUSPENSION INTRAMUSCULAR at 14:42

## 2017-01-31 RX ADMIN — Medication 250 MILLIGRAM(S): at 00:48

## 2017-01-31 RX ADMIN — Medication 1 SPRAY(S): at 05:26

## 2017-01-31 RX ADMIN — Medication 1 SPRAY(S): at 09:42

## 2017-01-31 RX ADMIN — Medication 100 MILLIGRAM(S): at 00:48

## 2017-01-31 RX ADMIN — AMLODIPINE BESYLATE 5 MILLIGRAM(S): 2.5 TABLET ORAL at 05:16

## 2017-01-31 RX ADMIN — Medication 250 MILLIGRAM(S): at 11:43

## 2017-01-31 RX ADMIN — Medication 250 MILLIGRAM(S): at 05:16

## 2017-01-31 RX ADMIN — Medication 50 MILLIGRAM(S): at 05:16

## 2017-01-31 RX ADMIN — Medication 100 MILLIGRAM(S): at 09:15

## 2017-01-31 RX ADMIN — Medication 25 MILLIGRAM(S): at 11:43

## 2017-01-31 RX ADMIN — Medication 25 MILLIGRAM(S): at 05:16

## 2017-01-31 NOTE — DISCHARGE NOTE ADULT - NSTOBACCOHOTLINE_GEN_A_NCS
[facility] Smokers Quitline ([phone]-XW-[id number]) [facility] Smokers Quitline (892-TK-AAIDM)

## 2017-01-31 NOTE — DISCHARGE NOTE ADULT - PATIENT PORTAL LINK FT
“You can access the FollowHealth Patient Portal, offered by [facility], by registering with the following website: [email or web address]”

## 2017-01-31 NOTE — DISCHARGE NOTE ADULT - MEDICATION SUMMARY - MEDICATIONS TO STOP TAKING
I will STOP taking the medications listed below when I get home from the hospital:    clopidogrel 75 mg oral tablet  -- 1 tab(s) by mouth once a day    furosemide 20 mg oral tablet  -- 1 tab(s) by mouth once a day    esomeprazole 40 mg oral delayed release capsule  -- 1 cap(s) by mouth once a day    Coumadin 3 mg oral tablet  -- 1 tab(s) by mouth once a day    hydrocortisone 25 mg rectal suppository  -- 1 suppository(ies) rectally once a day (at bedtime)    zaleplon 5 mg oral capsule  -- 1 cap(s) by mouth once a day (at bedtime), As needed, Insomnia MDD:1 tab

## 2017-01-31 NOTE — DISCHARGE NOTE ADULT - CARE PROVIDER_API CALL
Felipa Ramirez), Infectious Disease; Internal Medicine  [address] [address]  Phone: [phone]  Fax: [phone] Felipa Ramirez), Infectious Disease; Internal Medicine  [address] [facility] [address]  [place] [address]  Phone: [phone]  Fax: [phone]    Ko Norris (MD), Hematology; Internal Medicine; Medical Oncology  [address] [place] [address]  Phone: [phone]  Fax: [phone]    Ishan Landry), Internal Medicine  [address] [place] [address]  Phone: [phone]  Fax: [phone]

## 2017-01-31 NOTE — DISCHARGE NOTE ADULT - HOSPITAL COURSE
to be completed  by attending md pt admitted with cellulitis and bacteremia    1/23    -    Cefazolin/ Vanco in ED. Continue Ancef per ID.              -    Lasix held due to DNOI   1/24    -    pt INR daily   1/ 24   -    blood culture positive for gram positive cocci  , pt is on vanco             -    ID called by MD  1/25    - 	coumadin on hold secondary to high INR             -	per pt family, pt is not taking xtandi- chemo  1/26    -	off iv vanco; cont ancef             - 	INR 1.90- coumadin dose per d/w md             -	hospice f/u  1/27	coumadin dose per md  	dc plan next week once hospice -home  1/30	possible dc home [facility] hospice tomorrow-    dc home with hospice

## 2017-01-31 NOTE — DISCHARGE NOTE ADULT - CARE PROVIDERS DIRECT ADDRESSES
,DirectAddress_Unknown,DirectAddress_Unknown ,DirectAddress_Unknown,[email or web address],DirectAddress_Unknown,DirectAddress_Unknown

## 2017-01-31 NOTE — DISCHARGE NOTE ADULT - CARE PLAN
Principal Discharge DX:	Bacteremia  Goal:	improve with antibiotic  Instructions for follow-up, activity and diet:	you were seen by infectious disease doctor  treated with antibiotic via IV , complete oral antibiotics as prescribed for 3 more weeks-total of 4 weeks from your blood culture negative  continue oral vancomycin for another 3 weeks-history of c diff  follow up by hospice doctor  Secondary Diagnosis:	Cellulitis  Instructions for follow-up, activity and diet:	complete antibiotic as prescribed  skin care  Secondary Diagnosis:	Atrial fibrillation  Instructions for follow-up, activity and diet:	your INR-is 3.98; hold coumadin until follow up INR in therapeutic to keep 2-3  follow up by hospice doctor  Secondary Diagnosis:	PC (prostate cancer)  Instructions for follow-up, activity and diet:	your chemo medication is on hold as per your doctor  follow up by oncologist  Secondary Diagnosis:	Type 2 diabetes mellitus  Instructions for follow-up, activity and diet:	HgA1C this admission.5.0  Make sure you get your HgA1c checked every three months.  If you take oral diabetes medications, check your blood glucose two times a day.  If you take insulin, check your blood glucose before meals and at bedtime.  It's important not to skip any meals.  Keep a log of your blood glucose results and always take it with you to your doctor appointments.  Keep a list of your current medications including injectables and over the counter medications and bring this medication list with you to all your doctor appointments.  If you have not seen your opthalmologist this year call for appointment.  Check your feet daily for redness, sores, or openings. Do not self treat. If no improvement in two days call your primary care physician for an appointment.  Low blood sugar (hypoglycemia) is a blood sugar below 70mg/dl. Check your blood sugar if you feel signs/symptoms of hypoglycemia. If your blood sugar is below 70 take 15 grams of carbohydrates (ex 4 oz of apple juice, 3-4 glucosr tablets, or 4-6 oz of regular soda) wait 15 minutes and repeat blood sugar to make sure it comes up above 70.  If your blood sugar is above 70 and you are due for a meal, have a meal.  If you are not due for a meal have a snack.  This snack helps keeps your blood sugar at a safe range.   follow up by hospice doctor

## 2017-01-31 NOTE — DISCHARGE NOTE ADULT - PLAN OF CARE
improve with antibiotic you were seen by infectious disease doctor  treated with antibiotic via IV , complete oral antibiotics as prescribed for 3 more weeks-total of 4 weeks from your blood culture negative  continue oral vancomycin for another 3 weeks-history of c diff  follow up by hospice doctor complete antibiotic as prescribed  skin care your INR-is 3.98; hold coumadin until follow up INR in therapeutic to keep 2-3  follow up by hospice doctor your chemo medication is on hold as per your doctor  follow up by oncologist HgA1C this admission.5.0  Make sure you get your HgA1c checked every three months.  If you take oral diabetes medications, check your blood glucose two times a day.  If you take insulin, check your blood glucose before meals and at bedtime.  It's important not to skip any meals.  Keep a log of your blood glucose results and always take it with you to your doctor appointments.  Keep a list of your current medications including injectables and over the counter medications and bring this medication list with you to all your doctor appointments.  If you have not seen your opthalmologist this year call for appointment.  Check your feet daily for redness, sores, or openings. Do not self treat. If no improvement in two days call your primary care physician for an appointment.  Low blood sugar (hypoglycemia) is a blood sugar below 70mg/dl. Check your blood sugar if you feel signs/symptoms of hypoglycemia. If your blood sugar is below 70 take 15 grams of carbohydrates (ex 4 oz of apple juice, 3-4 glucosr tablets, or 4-6 oz of regular soda) wait 15 minutes and repeat blood sugar to make sure it comes up above 70.  If your blood sugar is above 70 and you are due for a meal, have a meal.  If you are not due for a meal have a snack.  This snack helps keeps your blood sugar at a safe range.   follow up by hospice doctor

## 2017-01-31 NOTE — DISCHARGE NOTE ADULT - MEDICATION SUMMARY - MEDICATIONS TO TAKE
I will START or STAY ON the medications listed below when I get home from the hospital:    budesonide 0.5 mg/2 mL inhalation suspension  -- 1 pump(s) inhaled 2 times a day  -- Indication: For bronchodilator    tamsulosin 0.4 mg oral capsule  -- 1 cap(s) by mouth once a day (at bedtime)  -- Indication: For prostate    glimepiride 1 mg oral tablet  -- 1/2 tab(s) by mouth once a day  -- Indication: For diabetes    meclizine 25 mg oral tablet  -- 1 tab(s) by mouth every 6 hours  -- Indication: For vertigo    atorvastatin 40 mg oral tablet  -- 1 tab(s) by mouth once a day (at bedtime)  -- Indication: For lipid lowering    metoprolol succinate 50 mg oral tablet, extended release  -- 1 tab(s) by mouth once a day  -- Indication: For hypertension    Atrovent 18 mcg/inh inhalation aerosol  -- 2 puff(s) inhaled every 6 hours, As Needed  -- Indication: For bronchodilator    amLODIPine 5 mg oral tablet  -- 1 tab(s) by mouth once a day  -- Indication: For hypertension    cephalexin 250 mg oral capsule  -- 1 cap(s) by mouth 4 times a day  -- Finish all this medication unless otherwise directed by prescriber.    -- Indication: For bacteremia    nystatin 100,000 units/g topical powder  -- 1 application on skin 2 times a day  -- Indication: For skin care    vancomycin 250 mg/5 mL oral solution  -- 2.5 milliliter(s) by mouth every 6 hours  -- Indication: For history of c diff    docusate sodium 100 mg oral capsule  -- 1 cap(s) by mouth 2 times a day, As Needed  -- constipation  -- Indication: For stool softner    sodium chloride 0.65% nasal spray  -- 1 spray(s) into nose 4 times a day, As Needed  -- Indication: For nasal spray    lactobacillus acidophilus oral capsule  -- 1 cap(s) by mouth 2 times a day  -- Indication: For probiotic    esomeprazole 40 mg oral delayed release capsule  -- 1 cap(s) by mouth once a day  -- Indication: For stomach I will START or STAY ON the medications listed below when I get home from the hospital:    budesonide 0.5 mg/2 mL inhalation suspension  -- 1 pump(s) inhaled 2 times a day  -- Indication: For bronchodilator    tamsulosin 0.4 mg oral capsule  -- 1 cap(s) by mouth once a day (at bedtime)  -- Indication: For prostate    glimepiride 1 mg oral tablet  -- 1/2 tab(s) by mouth once a day  -- Indication: For diabetes    meclizine 25 mg oral tablet  -- 1 tab(s) by mouth every 6 hours  -- Indication: For vertigo    atorvastatin 40 mg oral tablet  -- 1 tab(s) by mouth once a day (at bedtime)  -- Indication: For lipid lowering    metoprolol succinate 50 mg oral tablet, extended release  -- 1 tab(s) by mouth once a day  -- Indication: For hypertension    Atrovent 18 mcg/inh inhalation aerosol  -- 2 puff(s) inhaled every 6 hours, As Needed  -- Indication: For bronchodilator    amLODIPine 5 mg oral tablet  -- 1 tab(s) by mouth once a day  -- Indication: For hypertension    cephalexin 250 mg oral capsule  -- 1 cap(s) by mouth 4 times a day  -- Finish all this medication unless otherwise directed by prescriber.    -- Indication: For bacteremia    nystatin 100,000 units/g topical powder  -- 1 application on skin 2 times a day  -- Indication: For skin care    vancomycin 250 mg/5 mL oral solution  -- 2.5 milliliter(s) by mouth every 6 hours  -- Indication: For history of c diff    docusate sodium 100 mg oral capsule  -- 1 cap(s) by mouth 2 times a day, As Needed  -- constipation  -- Indication: For stool softner    sodium chloride 0.65% nasal spray  -- 1 spray(s) into nose 4 times a day, As Needed  -- Indication: For nasal spray    lactobacillus acidophilus oral capsule  -- 1 cap(s) by mouth 2 times a day  -- Indication: For Probiotic    esomeprazole 40 mg oral delayed release capsule  -- 1 cap(s) by mouth once a day  -- Indication: For stomach

## 2017-01-31 NOTE — DISCHARGE NOTE ADULT - INSTRUCTIONS
mechanical soft, low sodium, low cholesterol diabetic diet; glucerna 2 cans daily warm packs to left arm for comfort. Notify MD if increased redness/swelling/warmth or pain to Left arm, or fever>100F. Continue home meds a prescribed. Apply barrier cream/nystatin to william/groin area IAD. Check blood sugars pre-meal and bedtime.

## 2017-01-31 NOTE — DISCHARGE NOTE ADULT - MEDICATION SUMMARY - MEDICATIONS TO CHANGE
I will SWITCH the dose or number of times a day I take the medications listed below when I get home from the hospital:    docusate sodium 100 mg oral capsule  -- 1 cap(s) by mouth 2 times a day

## 2017-02-02 LAB — INR PPP: 4.11

## 2017-02-03 LAB — INR PPP: 2.23

## 2017-02-17 LAB — INR PPP: 2.49

## 2017-02-23 LAB — INR PPP: 4.07

## 2017-02-28 LAB — INR PPP: 2.04

## 2017-03-01 LAB
CULTURE RESULTS: SIGNIFICANT CHANGE UP
SPECIMEN SOURCE: SIGNIFICANT CHANGE UP

## 2017-03-02 LAB
INR PPP: 2.81
PT BLD: 33.6

## 2017-03-06 ENCOUNTER — MEDICATION RENEWAL (OUTPATIENT)
Age: 82
End: 2017-03-06

## 2017-03-07 ENCOUNTER — MEDICATION RENEWAL (OUTPATIENT)
Age: 82
End: 2017-03-07

## 2017-03-07 RX ORDER — BUDESONIDE 0.5 MG/2ML
0.5 INHALANT ORAL
Qty: 1 | Refills: 3 | Status: ACTIVE | COMMUNITY
Start: 2017-03-07 | End: 1900-01-01

## 2017-03-08 ENCOUNTER — APPOINTMENT (OUTPATIENT)
Dept: CARDIOLOGY | Facility: CLINIC | Age: 82
End: 2017-03-08

## 2017-03-09 LAB — INR PPP: 4.92

## 2017-03-15 ENCOUNTER — RX RENEWAL (OUTPATIENT)
Age: 82
End: 2017-03-15

## 2017-03-15 ENCOUNTER — MEDICATION RENEWAL (OUTPATIENT)
Age: 82
End: 2017-03-15

## 2017-03-15 DIAGNOSIS — F51.04 PSYCHOPHYSIOLOGIC INSOMNIA: ICD-10-CM

## 2017-03-15 RX ORDER — ZOLPIDEM TARTRATE 5 MG/1
5 TABLET ORAL
Qty: 30 | Refills: 0 | Status: ACTIVE | COMMUNITY
Start: 2017-03-15 | End: 1900-01-01

## 2017-03-15 RX ORDER — ZOLPIDEM TARTRATE 5 MG/1
5 TABLET ORAL
Qty: 30 | Refills: 0 | Status: DISCONTINUED | COMMUNITY
End: 2017-03-15

## 2017-03-15 RX ORDER — ZOLPIDEM TARTRATE 5 MG/1
5 TABLET, FILM COATED ORAL
Refills: 0 | Status: DISCONTINUED | COMMUNITY
End: 2017-03-15

## 2017-03-17 ENCOUNTER — RESULT REVIEW (OUTPATIENT)
Age: 82
End: 2017-03-17

## 2017-03-17 LAB
INR PPP: 2.2
PT BLD: 27

## 2017-03-24 LAB — INR PPP: 1.8

## 2017-03-30 LAB — INR PPP: 2.18

## 2017-04-07 LAB — INR PPP: 2.6

## 2017-04-12 RX ORDER — TAMSULOSIN HYDROCHLORIDE 0.4 MG/1
0.4 CAPSULE ORAL
Qty: 90 | Refills: 0 | Status: ACTIVE | COMMUNITY
Start: 2017-01-03 | End: 1900-01-01

## 2017-04-13 ENCOUNTER — RESULT REVIEW (OUTPATIENT)
Age: 82
End: 2017-04-13

## 2017-04-14 LAB
INR PPP: 2.1
PT BLD: 25.4

## 2017-04-20 ENCOUNTER — RESULT REVIEW (OUTPATIENT)
Age: 82
End: 2017-04-20

## 2017-04-21 LAB
INR PPP: 1.4
PROTHROMBIN TIME COMMENT: NORMAL

## 2018-09-17 NOTE — H&P ADULT. - TOBACCO USE
Med: Vyvanse  Dosage: 10 mg  Si daily  Quantity requested:  30    Preferred pharmacy has been set up and verified.   Never smoker

## 2019-07-03 NOTE — ED ADULT NURSE NOTE - ATTEMPT TO OOB
Medical Necessity Clause: This procedure was medically necessary because the lesions that were treated were: no

## 2020-03-16 NOTE — ED ADULT NURSE REASSESSMENT NOTE - CARDIO WDL
Normal rate, regular rhythm, normal S1, S2 heart sounds heard. Topical Clindamycin Counseling: Patient counseled that this medication may cause skin irritation or allergic reactions.  In the event of skin irritation, the patient was advised to reduce the amount of the drug applied or use it less frequently.   The patient verbalized understanding of the proper use and possible adverse effects of clindamycin.  All of the patient's questions and concerns were addressed.

## 2020-11-04 NOTE — PATIENT PROFILE ADULT. - CAREGIVER
Concern for worsening infectious process/Concern for cardiopulmonary deterioration/Concern for delay in diagnosis and treatment Declines

## 2022-03-02 NOTE — DISCHARGE NOTE ADULT - WARFARIN/COUMADIN - COMPLIANCE
Care Transition NurseParas from [facility] called sent message on 2/25 regarding the xarelto 20 mg patient is taking with breakfast, makes patient very nausea. Wants to know if something else can be prescribed and discussed with patient? Please call patient and advise. Thank you.
I spoke with pt  and relayed message per [facility]. He verbalized understanding.
Patient may try Eliquis 5 mg BID, will send a script to see if she tolerates this better  Medication sent to [facility]
Please advise, Thank you.
Statement Selected

## 2022-09-29 NOTE — H&P ADULT. - PROBLEM/PLAN-9
Tazorac Pregnancy And Lactation Text: This medication is not safe during pregnancy. It is unknown if this medication is excreted in breast milk. DISPLAY PLAN FREE TEXT

## 2022-11-02 NOTE — PATIENT PROFILE ADULT. - PATIENT REPRESENTATIVE: ( YOU CAN CHOOSE ANY PERSON THAT CAN ASSIST YOU WITH YOUR HEALTH CARE PREFERENCES, DOES NOT HAVE TO BE A SPOUSE, IMMEDIATE FAMILY OR SIGNIFICANT OTHER/PARTNER)
Initiate Treatment: cephalexin 500mg capsule tid x 10 days\\nmupirocin ointment twice a day x 10 days Detail Level: Simple Yes

## 2025-01-21 NOTE — ED ADULT NURSE NOTE - TOBACCO USE
Detail Level: Simple Instructions: This plan will send the code FBSE to the PM system.  DO NOT or CHANGE the price. Price (Do Not Change): 0.00 Never smoker